# Patient Record
Sex: FEMALE | Race: WHITE | NOT HISPANIC OR LATINO | Employment: OTHER | URBAN - METROPOLITAN AREA
[De-identification: names, ages, dates, MRNs, and addresses within clinical notes are randomized per-mention and may not be internally consistent; named-entity substitution may affect disease eponyms.]

---

## 2018-05-16 PROBLEM — I50.32 CHRONIC DIASTOLIC HEART FAILURE: Status: ACTIVE | Noted: 2018-05-16

## 2019-08-31 PROBLEM — E66.01 MORBID OBESITY WITH BMI OF 40.0-44.9, ADULT: Status: ACTIVE | Noted: 2019-08-31

## 2019-08-31 PROBLEM — E87.6 HYPOKALEMIA: Status: ACTIVE | Noted: 2019-08-31

## 2019-08-31 PROBLEM — T84.54XA INFECTION OF PROSTHETIC LEFT KNEE JOINT: Status: ACTIVE | Noted: 2019-08-31

## 2020-02-06 PROBLEM — K76.0 FATTY LIVER DISEASE, NONALCOHOLIC: Status: ACTIVE | Noted: 2020-02-06

## 2020-02-06 PROBLEM — I50.33 ACUTE ON CHRONIC DIASTOLIC CONGESTIVE HEART FAILURE: Status: ACTIVE | Noted: 2020-02-05

## 2020-02-06 PROBLEM — E83.42 HYPOMAGNESEMIA: Status: ACTIVE | Noted: 2020-02-06

## 2020-02-06 PROBLEM — R00.1 BRADYCARDIA: Status: ACTIVE | Noted: 2020-02-06

## 2020-07-02 PROBLEM — Z79.01 CHRONIC ANTICOAGULATION: Status: ACTIVE | Noted: 2020-07-02

## 2020-09-03 PROBLEM — E66.812 CLASS 2 SEVERE OBESITY DUE TO EXCESS CALORIES WITH SERIOUS COMORBIDITY AND BODY MASS INDEX (BMI) OF 38.0 TO 38.9 IN ADULT: Status: ACTIVE | Noted: 2019-08-31

## 2020-09-03 PROBLEM — I50.9 ACUTE ON CHRONIC CONGESTIVE HEART FAILURE: Status: RESOLVED | Noted: 2020-07-02 | Resolved: 2020-09-03

## 2020-09-22 PROBLEM — I50.20 HFREF (HEART FAILURE WITH REDUCED EJECTION FRACTION): Status: ACTIVE | Noted: 2020-09-22

## 2021-01-20 ENCOUNTER — TELEPHONE (OUTPATIENT)
Dept: INTERNAL MEDICINE | Facility: CLINIC | Age: 81
End: 2021-01-20

## 2021-01-20 NOTE — TELEPHONE ENCOUNTER
Carlee with [facility] called to follow up on some orders that were sent.   10/4  11/12  1/7/21  1/14/21- 2 were sent this date  Please advise if any of them need to be resent or if you have them and have been faxed back. Fax # [phone]  Please call her back to follow up if they have been done or still pending signature of PCP at [phone]

## 2021-01-25 ENCOUNTER — TELEPHONE (OUTPATIENT)
Dept: INTERNAL MEDICINE | Facility: CLINIC | Age: 81
End: 2021-01-25

## 2021-01-25 NOTE — TELEPHONE ENCOUNTER
Caller: Linda Martel    Relationship to patient: Self    Best call back number: [phone]    Patient is needing: PT CALLED REQUESTING A CALL BACK REGARDING QUESTIONS ABOUT SEVERAL OF HER MEDICATIONS. PT STATES SHE WAS SEEN BY THE HEART FAILURE CLINIC AT [facility] AND THEY ARE WANTING TO CHANGE SOME MEDICATIONS AND WANTS TO MAKE SURE THIS WILL BE OK. PT DID NOT LEAVE ANY OTHER INFORMATION

## 2021-02-03 PROBLEM — R11.0 NAUSEA: Status: RESOLVED | Noted: 2020-02-07 | Resolved: 2021-02-03

## 2021-03-31 ENCOUNTER — TELEPHONE (OUTPATIENT)
Dept: INTERNAL MEDICINE | Facility: CLINIC | Age: 81
End: 2021-03-31

## 2021-03-31 NOTE — TELEPHONE ENCOUNTER
PATIENT ASKED FOR REBECCA TO CALL HER BACK REGARDING SAMPLES OF MEDICATION Insulin Degludec (TRESIBA FLEXTOUCH SC), STATED SHE WILL BE OUT NEXT WEEK AND WOULD LIKE MORE SAMPLES IF POSSIBLE.    We dont have any samples of Tresiba, can you please look and see if anything is compatible?  Thank you!

## 2021-03-31 NOTE — TELEPHONE ENCOUNTER
Caller: Michelle Martel    Relationship: Self    Best call back number: [phone] (H)    What is the best time to reach you: ANYTIME    Who are you requesting to speak with (clinical staff, provider,  specific staff member): REBECCA    Do you know the name of the person who called: MICHELLE MARTEL    What was the call regarding: PATIENT ASKED FOR REBECCA TO CALL HER BACK REGARDING SAMPLES OF MEDICATION Insulin Degludec (TRESIBA FLEXTOUCH SC), STATED SHE WILL BE OUT NEXT WEEK AND WOULD LIKE MORE SAMPLES IF POSSIBLE.    Do you require a callback: YES

## 2021-04-02 NOTE — TELEPHONE ENCOUNTER
Ok lets look and see what insulin we have in fridge today before we leave, what dose of tresiba is she currently using         Documentation     You  Karan Graham MD 2 days ago   MD     PATIENT ASKED FOR REBECCA TO CALL HER BACK REGARDING SAMPLES OF MEDICATION Insulin Degludec (TRESIBA FLEXTOUCH SC), STATED SHE WILL BE OUT NEXT WEEK AND WOULD LIKE MORE SAMPLES IF POSSIBLE.     We dont have any samples of Tresiba, can you please look and see if anything is compatible?  Thank you!          Tresiba 200mg 16units

## 2021-04-02 NOTE — TELEPHONE ENCOUNTER
Ok lets look and see what insulin we have in fridge today before we leave, what dose of tresiba is she currently using

## 2021-08-18 ENCOUNTER — TELEPHONE (OUTPATIENT)
Dept: INTERNAL MEDICINE | Facility: CLINIC | Age: 81
End: 2021-08-18

## 2021-08-18 NOTE — TELEPHONE ENCOUNTER
Caller: VICTORIA MARTI    Relationship to patient: Other    Best call back number: [phone]    Patient is needing: THERE WAS A REFERRAL FOR CAREGIVER FOR THE PATIENT SENT OVER ON 8/13/21. PLEASE REACH OUT AND LET THEM KNOW IF WE HAVE RECEIVED

## 2021-08-31 ENCOUNTER — TELEPHONE (OUTPATIENT)
Dept: INTERNAL MEDICINE | Facility: CLINIC | Age: 81
End: 2021-08-31

## 2021-08-31 NOTE — TELEPHONE ENCOUNTER
Caller: TOSIN KOTHARI/REAL"[employer]"    Best call back number: [phone]    Who are you requesting to speak with (clinical staff, provider,  specific staff member): CLINICAL STAFF MEMBER    What was the call regarding: TOSIN WITH [facility] CALLED NEEDING TO CONFIRM THAT DR. RALPH STILL WANTS THE PATIENT TO DO INR CHECKS. TOSIN STATES THEY STILL HAVE AN ACTIVE ORDER.     Do you require a callback: YES

## 2021-09-22 ENCOUNTER — TELEPHONE (OUTPATIENT)
Dept: INTERNAL MEDICINE | Facility: CLINIC | Age: 81
End: 2021-09-22

## 2021-09-22 NOTE — TELEPHONE ENCOUNTER
Caller: Argentina Rosales    Relationship: Emergency Contact    Best call back number: [phone]     What form or medical record are you requesting: PATIENT IS REQUESTING A RESERVED  PARKING SPOT NOT HANDICAP STICKER.    Who is requesting this form or medical record from you:  RESERVED PARKING SPOT     How would you like to receive the form or medical records (pick-up, mail, fax): FAX ATTENTION LI   If fax, what is the fax number: [phone] SAPNA JEFFERSON   If mail, what is the address:   If pick-up, provide patient with address and location details    Timeframe paperwork needed: 09/30/2021     Additional notes: PATIENT IS NEEDING THIS REQUEST, BY 09/30/2021

## 2021-11-04 NOTE — TELEPHONE ENCOUNTER
APPARENTLY, THERE WAS A FORM THAT NEEDED TO BE FILLED OUT PER AMADEO'S PREVIOUS MESSAGE.  I'M NOT SURE WHERE THAT IS.    THANK YOU

## 2022-02-03 ENCOUNTER — TELEPHONE (OUTPATIENT)
Dept: INTERNAL MEDICINE | Facility: CLINIC | Age: 82
End: 2022-02-03

## 2022-02-03 NOTE — TELEPHONE ENCOUNTER
Ok, hold her dose today and then restart as follows:    5mg on 2 days of week  2.5mg on 5 days of week    Rechcek 1 weeks

## 2022-02-03 NOTE — TELEPHONE ENCOUNTER
Caller: Linda Martel    Relationship to patient: Self    Best call back number: [phone]    Patient is needing: PATIENTS INR IS 4.2 TODAY

## 2022-04-29 PROBLEM — Z79.01 CHRONIC ANTICOAGULATION: Status: RESOLVED | Noted: 2020-07-02 | Resolved: 2022-04-29

## 2022-05-20 ENCOUNTER — TELEPHONE (OUTPATIENT)
Dept: INTERNAL MEDICINE | Facility: CLINIC | Age: 82
End: 2022-05-20

## 2022-05-20 NOTE — TELEPHONE ENCOUNTER
Caller: Linda Martel    Relationship: Self    Best call back number: [phone]    What is the best time to reach you: ANYTIME    Who are you requesting to speak with (clinical staff, provider,  specific staff member): NATY     Do you know the name of the person who called: NATY    What was the call regarding: INR    Do you require a callback: YES    ATTEMPTED TPO WARM TRANSFER

## 2023-01-06 ENCOUNTER — TELEPHONE (OUTPATIENT)
Dept: INTERNAL MEDICINE | Facility: CLINIC | Age: 83
End: 2023-01-06

## 2023-01-06 NOTE — TELEPHONE ENCOUNTER
Hub staff attempted to follow warm transfer process and was unsuccessful     Caller: [facility]    Relationship to patient:     Best call back number: [phone]    Patient is needing: FLY FROM [facility] REQUESTING A CALLBACK TO SPEAK TO WHO DOES RPIOR AUTHORIZATIONS REGARDING THE PATIENT'S Insulin Degludec (Tresiba FlexTouch) 200 UNIT/ML solution pen-injector pen injection.    REFERENCE NUMBER: [id number]

## 2023-02-15 ENCOUNTER — TELEPHONE (OUTPATIENT)
Dept: INTERNAL MEDICINE | Facility: CLINIC | Age: 83
End: 2023-02-15

## 2023-02-15 NOTE — TELEPHONE ENCOUNTER
Pharmacy Name:     [facility] - [place], [place] - [address] [facility] [address] - [phone] [facility] [phone] FX (Pharmacy) [phone]         Pharmacy representative name:  DOUGLAS       What medication are you calling in regards to:     1.  NYSTANTIN POWDER   DIRECTIONS 3 TIMES DAILY   IS THAT REPLACING PREVIOUS RX QID     2.  OMEPRAZOLE 40 CAP   1QPRN   ONE DAILY OR WHAT IS MAX DAILY ?     3.  NITROG 0.4 TAB   MAX NUMBER OF TABS PER DAY       What question does the pharmacy have:     Who is the provider that prescribed the medication:  Authorized by: GERONIMO RALPH

## 2023-03-27 ENCOUNTER — TELEPHONE (OUTPATIENT)
Dept: INTERNAL MEDICINE | Facility: CLINIC | Age: 83
End: 2023-03-27

## 2023-03-27 NOTE — TELEPHONE ENCOUNTER
Caller: FELIPE    Relationship: Other    Best call back number: [phone]    What is the best time to reach you: ANY TIME    Who are you requesting to speak with (clinical staff, provider,  specific staff member): CLINICAL STAFF    What was the call regarding: FELIPE WITH [facility] CALLED AND REQUESTS A CALLBACK REGARDING PRE-AUTHROIZATION FOR TRESIBA FLEX TOUCH.  CALL REFERENCE # [id number].    Do you require a callback: YES    PLEASE ADVISE.

## 2023-04-04 ENCOUNTER — TELEPHONE (OUTPATIENT)
Dept: INTERNAL MEDICINE | Facility: CLINIC | Age: 83
End: 2023-04-04

## 2023-04-04 NOTE — TELEPHONE ENCOUNTER
Caller: [facility] - [place], [place] - [address] [facility] [address] - [phone] [facility] [phone]     Relationship to patient: Pharmacy    Best call back number: [phone]    Patient is needing:   WE HAVE AN INCORRECT DATE OF BIRTH BECAUSE HER BIRTH CERTIFICATE HAS AN INCORRECT , 1940 FOR INSURANCE REASONS ALTHOUGH IT'S 1940 [employer] HAS BEEN BILLING WITH 1940 AND THE AGENT ADVISED THAT IT DOESN'T MAKE SENSE THAT WE HAVE THE INCORRECT  AND THAT INSURANCE WOULD APPROVE BOTH.     TRYING TO GET CLARIFICATION.       REFERENCE: [id number]

## 2023-05-08 ENCOUNTER — TELEPHONE (OUTPATIENT)
Dept: INTERNAL MEDICINE | Facility: CLINIC | Age: 83
End: 2023-05-08

## 2023-05-08 NOTE — TELEPHONE ENCOUNTER
Caller: [facility] - [place], [place] - [address] [facility] - [phone] [facility] [phone] FX    Relationship: Pharmacy    Best call back number: [phone]    What medication are you requesting: FLUCONAZOLE 150 MG TABLETS     What are your current symptoms: PHARMACY STATES PATIENT IS REQUESTING A 90 DAY REFILL FOR THIS     How long have you been experiencing symptoms: TODAY     Have you had these symptoms before:    [x] Yes  [] No    Have you been treated for these symptoms before:   [x] Yes  [] No    If a prescription is needed, what is your preferred pharmacy and phone number: [facility] - [place], [place] - [address] [facility] - [phone] [facility] [phone] FX     Additional notes:

## 2023-05-19 PROBLEM — I50.812 CHRONIC RIGHT-SIDED HEART FAILURE: Status: ACTIVE | Noted: 2023-05-19

## 2023-05-27 ENCOUNTER — APPOINTMENT (OUTPATIENT)
Dept: GENERAL RADIOLOGY | Facility: HOSPITAL | Age: 83
End: 2023-05-27
Payer: MEDICARE

## 2023-05-27 ENCOUNTER — HOSPITAL ENCOUNTER (OUTPATIENT)
Facility: HOSPITAL | Age: 83
Setting detail: OBSERVATION
Discharge: HOME | End: 2023-05-28
Attending: EMERGENCY MEDICINE | Admitting: EMERGENCY MEDICINE
Payer: MEDICARE

## 2023-05-27 DIAGNOSIS — I48.20 CHRONIC ATRIAL FIBRILLATION: ICD-10-CM

## 2023-05-27 DIAGNOSIS — I50.9 ACUTE ON CHRONIC CONGESTIVE HEART FAILURE, UNSPECIFIED HEART FAILURE TYPE: Primary | ICD-10-CM

## 2023-05-27 LAB
ALBUMIN SERPL-MCNC: 4.6 G/DL (ref 3.5–5.2)
ALBUMIN/GLOB SERPL: 1.8 G/DL
ALP SERPL-CCNC: 94 U/L (ref 39–117)
ALT SERPL W P-5'-P-CCNC: 13 U/L (ref 1–33)
ANION GAP SERPL CALCULATED.3IONS-SCNC: 12 MMOL/L (ref 5–15)
AST SERPL-CCNC: 21 U/L (ref 1–32)
BASOPHILS # BLD AUTO: 0.04 10*3/MM3 (ref 0–0.2)
BASOPHILS NFR BLD AUTO: 0.6 % (ref 0–1.5)
BILIRUB SERPL-MCNC: 0.9 MG/DL (ref 0–1.2)
BUN SERPL-MCNC: 40 MG/DL (ref 8–23)
BUN/CREAT SERPL: 26.5 (ref 7–25)
CALCIUM SPEC-SCNC: 9.6 MG/DL (ref 8.6–10.5)
CHLORIDE SERPL-SCNC: 96 MMOL/L (ref 98–107)
CHOLEST SERPL-MCNC: 113 MG/DL (ref 0–200)
CO2 SERPL-SCNC: 31 MMOL/L (ref 22–29)
CREAT SERPL-MCNC: 1.51 MG/DL (ref 0.57–1)
DEPRECATED RDW RBC AUTO: 41.1 FL (ref 37–54)
EGFRCR SERPLBLD CKD-EPI 2021: 34.4 ML/MIN/1.73
EOSINOPHIL # BLD AUTO: 0 10*3/MM3 (ref 0–0.4)
EOSINOPHIL NFR BLD AUTO: 0 % (ref 0.3–6.2)
ERYTHROCYTE [DISTWIDTH] IN BLOOD BY AUTOMATED COUNT: 12.5 % (ref 12.3–15.4)
GEN 5 2HR TROPONIN T REFLEX: 28 NG/L
GLOBULIN UR ELPH-MCNC: 2.5 GM/DL
GLUCOSE BLDC GLUCOMTR-MCNC: 119 MG/DL (ref 70–130)
GLUCOSE BLDC GLUCOMTR-MCNC: 120 MG/DL (ref 70–130)
GLUCOSE SERPL-MCNC: 103 MG/DL (ref 65–99)
HCT VFR BLD AUTO: 44.5 % (ref 34–46.6)
HDLC SERPL-MCNC: 36 MG/DL (ref 40–60)
HGB BLD-MCNC: 14.5 G/DL (ref 12–15.9)
IMM GRANULOCYTES # BLD AUTO: 0.02 10*3/MM3 (ref 0–0.05)
IMM GRANULOCYTES NFR BLD AUTO: 0.3 % (ref 0–0.5)
INR PPP: 2.63 (ref 0.9–1.1)
INR PPP: 2.64 (ref 0.9–1.1)
LDLC SERPL CALC-MCNC: 44 MG/DL (ref 0–100)
LDLC/HDLC SERPL: 1.02 {RATIO}
LYMPHOCYTES # BLD AUTO: 0.97 10*3/MM3 (ref 0.7–3.1)
LYMPHOCYTES NFR BLD AUTO: 14.9 % (ref 19.6–45.3)
MCH RBC QN AUTO: 29.7 PG (ref 26.6–33)
MCHC RBC AUTO-ENTMCNC: 32.6 G/DL (ref 31.5–35.7)
MCV RBC AUTO: 91.2 FL (ref 79–97)
MONOCYTES # BLD AUTO: 0.45 10*3/MM3 (ref 0.1–0.9)
MONOCYTES NFR BLD AUTO: 6.9 % (ref 5–12)
NEUTROPHILS NFR BLD AUTO: 5.03 10*3/MM3 (ref 1.7–7)
NEUTROPHILS NFR BLD AUTO: 77.3 % (ref 42.7–76)
NRBC BLD AUTO-RTO: 0 /100 WBC (ref 0–0.2)
NT-PROBNP SERPL-MCNC: 1268 PG/ML (ref 0–1800)
PLATELET # BLD AUTO: 231 10*3/MM3 (ref 140–450)
PMV BLD AUTO: 10.5 FL (ref 6–12)
POTASSIUM SERPL-SCNC: 5.2 MMOL/L (ref 3.5–5.2)
PROT SERPL-MCNC: 7.1 G/DL (ref 6–8.5)
PROTHROMBIN TIME: 28.6 SECONDS (ref 11.7–14.2)
PROTHROMBIN TIME: 28.7 SECONDS (ref 11.7–14.2)
QT INTERVAL: 373 MS
RBC # BLD AUTO: 4.88 10*6/MM3 (ref 3.77–5.28)
SODIUM SERPL-SCNC: 139 MMOL/L (ref 136–145)
TRIGL SERPL-MCNC: 202 MG/DL (ref 0–150)
TROPONIN T DELTA: 7 NG/L
TROPONIN T SERPL HS-MCNC: 21 NG/L
TROPONIN T SERPL HS-MCNC: 23 NG/L
TROPONIN T SERPL HS-MCNC: 27 NG/L
VLDLC SERPL-MCNC: 33 MG/DL (ref 5–40)
WBC NRBC COR # BLD: 6.51 10*3/MM3 (ref 3.4–10.8)

## 2023-05-27 PROCEDURE — 85610 PROTHROMBIN TIME: CPT | Performed by: EMERGENCY MEDICINE

## 2023-05-27 PROCEDURE — 83880 ASSAY OF NATRIURETIC PEPTIDE: CPT | Performed by: EMERGENCY MEDICINE

## 2023-05-27 PROCEDURE — 93005 ELECTROCARDIOGRAM TRACING: CPT | Performed by: NURSE PRACTITIONER

## 2023-05-27 PROCEDURE — 71046 X-RAY EXAM CHEST 2 VIEWS: CPT

## 2023-05-27 PROCEDURE — 84484 ASSAY OF TROPONIN QUANT: CPT | Performed by: EMERGENCY MEDICINE

## 2023-05-27 PROCEDURE — 99285 EMERGENCY DEPT VISIT HI MDM: CPT

## 2023-05-27 PROCEDURE — 85025 COMPLETE CBC W/AUTO DIFF WBC: CPT | Performed by: EMERGENCY MEDICINE

## 2023-05-27 PROCEDURE — G0378 HOSPITAL OBSERVATION PER HR: HCPCS

## 2023-05-27 PROCEDURE — 36415 COLL VENOUS BLD VENIPUNCTURE: CPT

## 2023-05-27 PROCEDURE — 93010 ELECTROCARDIOGRAM REPORT: CPT | Performed by: STUDENT IN AN ORGANIZED HEALTH CARE EDUCATION/TRAINING PROGRAM

## 2023-05-27 PROCEDURE — 96374 THER/PROPH/DIAG INJ IV PUSH: CPT

## 2023-05-27 PROCEDURE — 71045 X-RAY EXAM CHEST 1 VIEW: CPT

## 2023-05-27 PROCEDURE — 84484 ASSAY OF TROPONIN QUANT: CPT | Performed by: NURSE PRACTITIONER

## 2023-05-27 PROCEDURE — 80053 COMPREHEN METABOLIC PANEL: CPT | Performed by: EMERGENCY MEDICINE

## 2023-05-27 PROCEDURE — 82948 REAGENT STRIP/BLOOD GLUCOSE: CPT

## 2023-05-27 PROCEDURE — 93005 ELECTROCARDIOGRAM TRACING: CPT | Performed by: EMERGENCY MEDICINE

## 2023-05-27 PROCEDURE — 25010000002 FUROSEMIDE PER 20 MG: Performed by: EMERGENCY MEDICINE

## 2023-05-27 PROCEDURE — 85610 PROTHROMBIN TIME: CPT | Performed by: NURSE PRACTITIONER

## 2023-05-27 PROCEDURE — 80061 LIPID PANEL: CPT | Performed by: NURSE PRACTITIONER

## 2023-05-27 RX ORDER — WARFARIN SODIUM 2.5 MG/1
2.5 TABLET ORAL ONCE
Status: DISCONTINUED | OUTPATIENT
Start: 2023-05-27 | End: 2023-05-27

## 2023-05-27 RX ORDER — HYDROCODONE BITARTRATE AND ACETAMINOPHEN 5; 325 MG/1; MG/1
1 TABLET ORAL EVERY 6 HOURS PRN
Status: DISCONTINUED | OUTPATIENT
Start: 2023-05-27 | End: 2023-05-28 | Stop reason: HOSPADM

## 2023-05-27 RX ORDER — DILTIAZEM HYDROCHLORIDE 120 MG/1
120 CAPSULE, COATED, EXTENDED RELEASE ORAL DAILY
Status: DISCONTINUED | OUTPATIENT
Start: 2023-05-27 | End: 2023-05-27

## 2023-05-27 RX ORDER — DOXYCYCLINE 100 MG/1
100 CAPSULE ORAL EVERY 12 HOURS SCHEDULED
Status: DISCONTINUED | OUTPATIENT
Start: 2023-05-27 | End: 2023-05-28 | Stop reason: HOSPADM

## 2023-05-27 RX ORDER — SODIUM CHLORIDE 9 MG/ML
40 INJECTION, SOLUTION INTRAVENOUS AS NEEDED
Status: DISCONTINUED | OUTPATIENT
Start: 2023-05-27 | End: 2023-05-28 | Stop reason: HOSPADM

## 2023-05-27 RX ORDER — VITAMIN E 268 MG
400 CAPSULE ORAL DAILY
Status: DISCONTINUED | OUTPATIENT
Start: 2023-05-27 | End: 2023-05-28 | Stop reason: HOSPADM

## 2023-05-27 RX ORDER — WARFARIN SODIUM 2.5 MG/1
2.5 TABLET ORAL ONCE
Status: COMPLETED | OUTPATIENT
Start: 2023-05-27 | End: 2023-05-27

## 2023-05-27 RX ORDER — SODIUM CHLORIDE 0.9 % (FLUSH) 0.9 %
10 SYRINGE (ML) INJECTION AS NEEDED
Status: DISCONTINUED | OUTPATIENT
Start: 2023-05-27 | End: 2023-05-28 | Stop reason: HOSPADM

## 2023-05-27 RX ORDER — METOPROLOL SUCCINATE 50 MG/1
100 TABLET, EXTENDED RELEASE ORAL 2 TIMES DAILY
Status: DISCONTINUED | OUTPATIENT
Start: 2023-05-27 | End: 2023-05-27

## 2023-05-27 RX ORDER — FERROUS SULFATE 325(65) MG
325 TABLET ORAL
Status: DISCONTINUED | OUTPATIENT
Start: 2023-05-28 | End: 2023-05-28 | Stop reason: HOSPADM

## 2023-05-27 RX ORDER — SPIRONOLACTONE 25 MG/1
12.5 TABLET ORAL DAILY
Status: DISCONTINUED | OUTPATIENT
Start: 2023-05-27 | End: 2023-05-28 | Stop reason: HOSPADM

## 2023-05-27 RX ORDER — FUROSEMIDE 10 MG/ML
20 INJECTION INTRAMUSCULAR; INTRAVENOUS ONCE
Status: COMPLETED | OUTPATIENT
Start: 2023-05-27 | End: 2023-05-27

## 2023-05-27 RX ORDER — NITROGLYCERIN 0.4 MG/1
0.4 TABLET SUBLINGUAL
Status: DISCONTINUED | OUTPATIENT
Start: 2023-05-27 | End: 2023-05-28 | Stop reason: HOSPADM

## 2023-05-27 RX ORDER — POTASSIUM CHLORIDE 750 MG/1
10 TABLET, FILM COATED, EXTENDED RELEASE ORAL NIGHTLY
Status: DISCONTINUED | OUTPATIENT
Start: 2023-05-27 | End: 2023-05-28 | Stop reason: HOSPADM

## 2023-05-27 RX ORDER — BUMETANIDE 2 MG/1
4 TABLET ORAL 2 TIMES DAILY
Status: DISCONTINUED | OUTPATIENT
Start: 2023-05-27 | End: 2023-05-27

## 2023-05-27 RX ORDER — PANTOPRAZOLE SODIUM 40 MG/1
40 TABLET, DELAYED RELEASE ORAL
Status: DISCONTINUED | OUTPATIENT
Start: 2023-05-28 | End: 2023-05-28 | Stop reason: HOSPADM

## 2023-05-27 RX ORDER — METOPROLOL SUCCINATE 50 MG/1
100 TABLET, EXTENDED RELEASE ORAL 2 TIMES DAILY
Status: DISCONTINUED | OUTPATIENT
Start: 2023-05-28 | End: 2023-05-28 | Stop reason: HOSPADM

## 2023-05-27 RX ORDER — SODIUM CHLORIDE 0.9 % (FLUSH) 0.9 %
10 SYRINGE (ML) INJECTION EVERY 12 HOURS SCHEDULED
Status: DISCONTINUED | OUTPATIENT
Start: 2023-05-27 | End: 2023-05-28 | Stop reason: HOSPADM

## 2023-05-27 RX ORDER — BUMETANIDE 2 MG/1
4 TABLET ORAL
Status: DISCONTINUED | OUTPATIENT
Start: 2023-05-28 | End: 2023-05-28 | Stop reason: HOSPADM

## 2023-05-27 RX ORDER — ASPIRIN 81 MG/1
324 TABLET, CHEWABLE ORAL ONCE
Status: COMPLETED | OUTPATIENT
Start: 2023-05-27 | End: 2023-05-27

## 2023-05-27 RX ORDER — PROCHLORPERAZINE MALEATE 10 MG
10 TABLET ORAL EVERY 6 HOURS PRN
Status: DISCONTINUED | OUTPATIENT
Start: 2023-05-27 | End: 2023-05-28 | Stop reason: HOSPADM

## 2023-05-27 RX ORDER — ASPIRIN 81 MG/1
81 TABLET ORAL DAILY
Status: DISCONTINUED | OUTPATIENT
Start: 2023-05-28 | End: 2023-05-28 | Stop reason: HOSPADM

## 2023-05-27 RX ORDER — ONDANSETRON 4 MG/1
4 TABLET, FILM COATED ORAL EVERY 4 HOURS PRN
Status: DISCONTINUED | OUTPATIENT
Start: 2023-05-27 | End: 2023-05-28 | Stop reason: HOSPADM

## 2023-05-27 RX ORDER — CHOLECALCIFEROL (VITAMIN D3) 125 MCG
1000 CAPSULE ORAL DAILY
Status: DISCONTINUED | OUTPATIENT
Start: 2023-05-27 | End: 2023-05-28 | Stop reason: HOSPADM

## 2023-05-27 RX ADMIN — POTASSIUM CHLORIDE 10 MEQ: 750 TABLET, EXTENDED RELEASE ORAL at 21:28

## 2023-05-27 RX ADMIN — DOXYCYCLINE 100 MG: 100 CAPSULE ORAL at 20:58

## 2023-05-27 RX ADMIN — INSULIN GLARGINE-YFGN 15 UNITS: 100 INJECTION, SOLUTION SUBCUTANEOUS at 21:29

## 2023-05-27 RX ADMIN — Medication 10 ML: at 21:28

## 2023-05-27 RX ADMIN — WARFARIN 2.5 MG: 2.5 TABLET ORAL at 21:26

## 2023-05-27 RX ADMIN — ASPIRIN 324 MG: 81 TABLET, CHEWABLE ORAL at 16:47

## 2023-05-27 RX ADMIN — Medication 400 UNITS: at 20:58

## 2023-05-27 RX ADMIN — FUROSEMIDE 20 MG: 20 INJECTION, SOLUTION INTRAMUSCULAR; INTRAVENOUS at 16:49

## 2023-05-27 NOTE — H&P
[facility]   HISTORY AND PHYSICAL    Patient Name: Linda Martel  : 1940  MRN: [id number]  Primary Care Physician:  Karan Graham MD  Date of admission: 2023    Subjective   Subjective     Chief Complaint:   Chief Complaint   Patient presents with   • Shortness of Breath         HPI:    Linda Martel is a pleasant afebrile ambulatory 83 y.o.  female with a past medical history of permanent atrial fibrillation on warfarin anticoagulation, hypertension, hyperlipidemia, pulmonary hypertension, type 2 diabetes, heart failure with reduced ejection fraction, and sleep apnea.    She presents to the emergency department at [facility] today with complaint of exertional dyspnea.  She has been admitted to the ED observation unit for further testing and evaluation.    Patient states that she was seen in the office by APRN with cardiology service on 2023.  She was having episodes of low blood pressure and was told to hold her diltiazem for a few days.  Unfortunately patient states she did not get follow-up with the cardiology service and stayed off of her diltiazem over the last 8 days.  She presents to the emergency department at [facility] with complaint of exertional dyspnea she was found to have some tachycardia with exertion, at rest her heart rate is in the 80s and low 90s but when she gets up to take a few steps her heart rate goes into the 110s and 120s..  She reports chronic mild lower extremity edema, she denies any obvious weight gain.  States she is compliant with her other medications.  She denies any chest pain, cough, fever or chills.    Review of Systems   All systems were reviewed and negative except for: Exertional dyspnea    Personal History     Past Medical History:   Diagnosis Date   • Atrial fibrillation    • CHF (congestive heart failure)    • Diabetes mellitus    • Hyperlipidemia    • Hypertension    • IBS (irritable bowel syndrome)     • Joint infection 2020    PROPHALITIC ANTIBX   • JUANITA (obstructive sleep apnea)    • Pulmonary hypertension        Past Surgical History:   Procedure Laterality Date   • APPENDECTOMY     • BACK SURGERY     • CARDIAC CATHETERIZATION N/A 06/01/2017    Procedure: Coronary angiography;  Surgeon: Jacques Elliott MD;  Location:  [facility] CATH INVASIVE LOCATION;  Service:    • CARDIAC CATHETERIZATION N/A 06/06/2018    Procedure: Right Heart Cath with vasodilator challenge  Dr. elliott to perform;  Surgeon: Jacques Elliott MD;  Location:  [facility]VAS[facility];  Service: Cardiovascular   • CARDIAC CATHETERIZATION N/A 06/06/2018    Procedure: Left Heart Cath;  Surgeon: Jacques Elliott MD;  Location:  [facility]ATH INVASIVE LOCATION;  Service: Cardiovascular   • CARDIAC CATHETERIZATION N/A 07/08/2020    Procedure: Right and Left Heart Cath;  Surgeon: Saima Andrade MD;  Location: [facility] CATH [facility]ASIVE LOC[facility];  Service: Cardiovascular;  Laterality: N/A;  cors     • CARDIAC CATHETERIZATION N/A 07/08/2020    Procedure: Coronary angiography;  Surgeon: Saima Andrade MD;  Location: [facility] C[facility]ASIVE LOC[facility];  Service: Cardiovascular;  Laterality: N/A;   • COLONOSCOPY     • EYE SURGERY Bilateral     CATARACTS   • GALLBLADDER SURGERY     • HIP SURGERY Left    • KNEE SURGERY Bilateral    • TONSILLECTOMY     • TOTAL HIP ARTHROPLASTY Right 5/2/2022    Procedure: TOTAL HIP ARTHROPLASTY ANTERIOR WITH HANA TABLE;  Surgeon: Gio Mays II, MD;  Location: [facility] OR;  Service: Orthopedics;  Laterality: Right;   • TOTAL KNEE ARTHROPLASTY Left 09/01/2019    Procedure: TOTAL KNEE ARTHROPLASTY, I&D with liner exchange;  Surgeon: Gio Mays II, MD;  Location: [facility] OR;  Service: Orthopedics       Family History: family history includes Cancer in her father; Heart disease in her father and mother; Stroke in her father. Otherwise pertinent FHx was reviewed and not pertinent to current issue.    Social  History:  reports that she has never smoked. She has never been exposed to tobacco smoke. She has never used smokeless tobacco. She reports that she does not currently use alcohol after a past usage of about 1.0 standard drink per week. She reports that she does not use drugs.    Home Medications:  Clotrimazole, HYDROcodone-acetaminophen, Insulin Glargine, Ondansetron HCl, OneTouch Delica Plus [id number], Probiotic Product, bumetanide, dilTIAZem CD, doxycycline, empagliflozin, ferrous sulfate, hydrocortisone, metFORMIN, metoprolol succinate XL, nitroglycerin, nystatin, omeprazole, potassium chloride, prochlorperazine, sacubitril-valsartan, senna, spironolactone, vitamin B-12, vitamin E, and warfarin    Allergies:  Allergies   Allergen Reactions   • Penicillins Rash       Objective   Objective     Vitals:   Temp:  [97.2 °F (36.2 °C)] 97.2 °F (36.2 °C)  Heart Rate:  [] 88  Resp:  [16] 16  BP: (101-134)/(66-97) 115/84  Physical Exam    Constitutional: Awake, alert, chronically ill-appearing   Eyes: PERRLA, sclerae anicteric, no conjunctival injection   HENT: NCAT, mucous membranes moist   Neck: Supple, no thyromegaly, no lymphadenopathy, trachea midline   Respiratory: Clear to auscultation bilaterally, nonlabored respirations    Cardiovascular: Irregularly irregular rhythm without tachycardia, no murmurs, rubs, or gallops, palpable pedal pulses bilaterally   Gastrointestinal: Positive bowel sounds, soft, nontender, nondistended   Musculoskeletal: 1+ nonpitting bilateral lower extremity edema, no clubbing or cyanosis to extremities   Psychiatric: Appropriate affect, cooperative   Neurologic: Oriented x 3, strength symmetric in all extremities, Cranial Nerves grossly intact to confrontation, speech clear   Skin: No rashes     Result Review    Result Review:  I have personally reviewed the results from the time of this admission to 5/27/2023 16:28 EDT and agree with these findings:  [x]  Laboratory list /  accordion  []  Microbiology  [x]  Radiology  [x]  EKG/Telemetry   []  Cardiology/Vascular   []  Pathology  []  Old records  []  Other:  Most notable findings include: Creatinine 1.51, high-sensitivity troponin 27, 23, triglycerides 202, INR 2.63, chest x-ray shows calcific aortic atherosclerosis mild central pulmonary venous congestion without overt pulmonary edema, small new left pleural effusion      Assessment & Plan   Assessment / Plan     Brief Patient Summary:  Linda Martel is a 83 y.o. female who is being evaluated for exertional dyspnea    Active Hospital Problems:  Active Hospital Problems    Diagnosis    • **Exertional dyspnea      Plan:     Exertional dyspnea  Echocardiogram pending  Consult to cardiology  High-sensitivity troponin 27, 23, trend  1 view chest x-ray shows nonspecific findings, 2 view chest x-ray pending  Continue Bumex    Atrial fibrillation  Continue warfarin, metoprolol  INR 2.63, repeat in a.m.  EKG shows atrial fibrillation rate of 101, QTc 44    Chronic kidney disease  Creatinine 1.51 today, baseline 1.3-1.5  Trend with a.m. labs    Type 2 diabetes  Continue home medications  Accu-Chek before every meal and nightly    DVT prophylaxis:  Medical DVT prophylaxis orders are present.    CODE STATUS:    Level Of Support Discussed With: Patient  Code Status (Patient has no pulse and is not breathing): CPR (Attempt to Resuscitate)  Medical Interventions (Patient has pulse or is breathing): Full Support    Admission Status:  I believe this patient meets observation status.    Electronically signed by LEYDA Castillo, 05/27/23, 4:28 PM EDT.      75 minutes has been spent by [facility] providers in the care of this patient while under observation status    I have worn appropriate PPE during this patient encounter, sanitized my hands both with entering and exiting patient's room.    I have discussed plan of care with patient including advance care plan and/or  surrogate decision maker.  Patient advises that their daughter Argentina will be their primary surrogate decision maker

## 2023-05-27 NOTE — ED PROVIDER NOTES
EMERGENCY DEPARTMENT ENCOUNTER    Room Number:  106/1  Date seen:  5/29/2023  PCP: Karan Graham MD  Historian: Patient  Relevant information provided by sources other than the patient, review of external records, and social determinants of health may be included in the HPI section.      HPI:  Chief Complaint: Dizziness and low blood pressure  Additional historical features obtained directly from: Patient's family member at bedside who states that she has been very short of breath with exertion and that she checked the blood pressure herself today and it was low.  Context: Linda Martel is a 83 y.o. female who presents to the ED c/o patient was seen by Dr. Farooq about a week ago, and I read that note in epic.  From that note, it was a follow-up visit and at that time her blood pressure was in the 80s over 60s but she felt pretty good.  As a result of that, her Cardizem was discontinued and she was to follow-up.  Patient said that initially for the first few days she felt better with more energy, but then she began to feel short of breath with exertion and felt like her heart rate was racing at times.  She did not have any chest pain, and she had a little swelling in her legs but not much more than usual.    Also reviewed the note in epic, the patient has an ejection fraction of 45 to 50% on her last echo.        Initial impression including social determinants which will impact the assessment patient actually looks pretty good overall, but sounds like she struggling with both rate control and blood pressure, has been trying to adjust in the outpatient setting with her cardiologist but seems to be getting a little worse rather than better with respect to dyspnea on exertion.  I suspect she will need to be admitted for optimization although she does not look unstable          PAST MEDICAL HISTORY  Active Ambulatory Problems     Diagnosis Date Noted   • Permanent atrial fibrillation (CMS/HCC) 10/03/2016    • Essential hypertension 10/03/2016   • Hyperlipidemia 10/03/2016   • Type 2 diabetes mellitus with hyperglycemia, without long-term current use of insulin 10/03/2016   • Pulmonary hypertension 05/16/2018   • JUANITA (obstructive sleep apnea) 08/31/2019   • Infection of prosthetic left knee joint 08/31/2019   • Class 2 severe obesity due to excess calories with serious comorbidity and body mass index (BMI) of 38.0 to 38.9 in adult 08/31/2019   • Fatty liver disease, nonalcoholic 02/06/2020   • HFrEF (heart failure with reduced ejection fraction) 09/22/2020   • Chronic right-sided heart failure 05/19/2023     Resolved Ambulatory Problems     Diagnosis Date Noted   • Chronic diastolic heart failure 05/16/2018   • Hypokalemia 08/31/2019   • Acute on chronic diastolic congestive heart failure 02/05/2020   • Digoxin toxicity 02/06/2020   • Acute kidney failure 02/06/2020   • Hypomagnesemia 02/06/2020   • Bradycardia 02/06/2020   • Nausea 02/07/2020   • Acute on chronic congestive heart failure 07/02/2020   • Chronic anticoagulation 07/02/2020   • DNR (do not resuscitate) 07/10/2020   • Hip joint replacement status 05/02/2022     Past Medical History:   Diagnosis Date   • Atrial fibrillation    • CHF (congestive heart failure)    • Diabetes mellitus    • Hypertension    • IBS (irritable bowel syndrome)    • Joint infection 2020         PAST SURGICAL HISTORY  Past Surgical History:   Procedure Laterality Date   • APPENDECTOMY     • BACK SURGERY     • CARDIAC CATHETERIZATION N/A 06/01/2017    Procedure: Coronary angiography;  Surgeon: Jacques Elliott MD;  Location:  [facility] CATH INVASIVE LOCATION;  Service:    • CARDIAC CATHETERIZATION N/A 06/06/2018    Procedure: Right Heart Cath with vasodilator challenge  Dr. elliott to perform;  Surgeon: Jacques Elliott MD;  Location:  [facility] CATH INVASIVE LOCATION;  Service: Cardiovascular   • CARDIAC CATHETERIZATION N/A 06/06/2018    Procedure: Left Heart Cath;  Surgeon: Jacques Elliott MD;   Location:  [facility] CATH INVASIVE LOCATION;  Service: Cardiovascular   • CARDIAC CATHETERIZATION N/A 07/08/2020    Procedure: Right and Left Heart Cath;  Surgeon: Saima Andrade MD;  Location:  [facility] CATH INVASIVE LOCATION;  Service: Cardiovascular;  Laterality: N/A;  cors     • CARDIAC CATHETERIZATION N/A 07/08/2020    Procedure: Coronary angiography;  Surgeon: Saima Andrade MD;  Location:  [facility] CATH INVASIVE LOCATION;  Service: Cardiovascular;  Laterality: N/A;   • COLONOSCOPY     • EYE SURGERY Bilateral     CATARACTS   • GALLBLADDER SURGERY     • HIP SURGERY Left    • KNEE SURGERY Bilateral    • TONSILLECTOMY     • TOTAL HIP ARTHROPLASTY Right 5/2/2022    Procedure: TOTAL HIP ARTHROPLASTY ANTERIOR WITH HANA TABLE;  Surgeon: Gio Mays II, MD;  Location: [facility] MAIN OR;  Service: Orthopedics;  Laterality: Right;   • TOTAL KNEE ARTHROPLASTY Left 09/01/2019    Procedure: TOTAL KNEE ARTHROPLASTY, I&D with liner exchange;  Surgeon: Gio Mays II, MD;  Location: [facility] MAIN OR;  Service: Orthopedics         FAMILY HISTORY  Family History   Problem Relation Age of Onset   • Heart disease Mother    • Heart disease Father    • Stroke Father    • Cancer Father    • Colon cancer Neg Hx    • Colon polyps Neg Hx    • Esophageal cancer Neg Hx    • Liver cancer Neg Hx    • Rectal cancer Neg Hx    • Stomach cancer Neg Hx    • Malig Hyperthermia Neg Hx          SOCIAL HISTORY  Social History     Socioeconomic History   • Marital status:    Tobacco Use   • Smoking status: Never     Passive exposure: Never   • Smokeless tobacco: Never   • Tobacco comments:     caffeine use: 1 cup daily.    Vaping Use   • Vaping Use: Never used   Substance and Sexual Activity   • Alcohol use: Not Currently     Alcohol/week: 1.0 standard drink     Types: 1 Glasses of wine per week     Comment: occasional   • Drug use: No   • Sexual activity: Defer         ALLERGIES  Penicillins          PHYSICAL EXAM  ED Triage  "Vitals [05/27/23 1206]   Temp Heart Rate Resp BP SpO2   97.2 °F (36.2 °C) 100 16 127/86 97 %      Temp src Heart Rate Source Patient Position BP Location FiO2 (%)   Tympanic Monitor -- -- --         Physical Exam      GENERAL: Awake and alert, no significant distress although she is quite dyspneic with minimal exertion  HENT: nares patent, some mild JVD is present  EYES: no scleral icterus, PERRL, EOMI  CV: Irregularly irregular with a rate of about 80 at rest and about 130 with exertion distal pulses symmetric and palpable  RESPIRATORY: normal effort, dyspneic with exertion but normal at rest.  A few rales in the bases  ABDOMEN: soft, nondistended nontender with normal bowel sound  MUSCULOSKELETAL: no deformity, trace pedal edema  NEURO: alert, moves all extremities, follows commands  PSYCH:  calm, cooperative  SKIN: warm, dry with no rash        LAB RESULTS  No results found for this or any previous visit (from the past 24 hour(s)).    Ordered the above labs and my independent interpretation of these results are discussed in the section labeled \"ED course\" below        RADIOLOGY  No Radiology Exams Resulted Within Past 24 Hours    Ordered the above noted radiological studies.  Independently interpreted by me in PACS.  My independent interpretation of these results are discussed in the section below labeled \"ED course\"        PROCEDURES  Procedures          MEDICATIONS GIVEN IN ER  Medications   furosemide (LASIX) injection 20 mg (20 mg Intravenous Given 5/27/23 1649)   aspirin chewable tablet 324 mg (324 mg Oral Given 5/27/23 1647)   warfarin (COUMADIN) tablet 2.5 mg (2.5 mg Oral Given 5/27/23 2126)   dilTIAZem (CARDIZEM) tablet 120 mg (120 mg Oral Given 5/28/23 1620)                   MEDICAL DECISION MAKING and INDEPENDENT INTERPRETATIONS      Everything documented below this statement is the \"ED course\" section which I have referred to above.  Everything discussed in the following section constitutes MY " INDEPENDENT INTERPRETATIONS of the lab work, EKGs, and radiology studies, and all of my personal conversations with other providers, and all of my independent decision making regarding this patient are discussed below.      ED Course as of 05/29/23 1207   Sat May 27, 2023   1955 Patient has chronic A-fib and is adequately anticoagulated.  Her blood pressure has been reasonable here although soft.  She is not in any distress, but when she gets up to walk she does get quite dyspneic and the heart rate goes up into the 130s in A-fib.  When she comes back and sits down it does slow down as does her breathing. [DP]   1956 She has chronic kidney disease, and perhaps digoxin may not be the best choice for her.  Its not clear from the documentation what other rate control agents have been tried, but I do think she would benefit from improved rate control for her symptoms. [DP]   1956 I spoke with Dr. Green from [facility] cardiology, and she agrees that we will admit the patient to the observation unit and they will consult.  We agreed to give her a small dose of Lasix given that her chest x-ray shows some mild volume overload, and I will relieve the rate control agents to them. [DP]   1956 I spoke with nurse practitioner the observation unit who agrees to admit this patient and help facilitate this plan [DP]   Mon May 29, 2023   1207 White blood cell count and hemoglobin unremarkable [DP]   1207 Troponin indeterminate [DP]   1207 Chemistry shows fairly stable chronic kidney disease [DP]      ED Course User Index  [DP] García Le MD         Everything documented above with this statement, in the ED course section constitutes my independent interpretation of lab work EKG and radiology studies along with my personal conversations with other providers and my independent medical decision making.  This statement will not be repeated before each data point, but they are all my independent interpretation needs contained within the  "\"ED course\" section.             All appropriate hygiene and PPE requirements were satisfied with this patient encounter      FINAL DIAGNOSES  Final diagnoses:   Acute on chronic congestive heart failure, unspecified heart failure type   Chronic atrial fibrillation           DISPOSITION  Admit to observation unit          Latest Documented Vital Signs:  As of 12:07 EDT  BP- 103/83 HR- (!) 123 Temp- 97.9 °F (36.6 °C) (Oral) O2 sat- 97%        --    Please note that portions of this were completed with a voice recognition program.       Note Disclaimer: At [facility], we believe that sharing information builds trust and better relationships. You are receiving this note because you are receiving care at [facility] or recently visited. It is possible you will see health information before a provider has talked with you about it. This kind of information can be easy to misunderstand. To help you fully understand what it      García Le MD  05/29/23 [id number]    "

## 2023-05-27 NOTE — PROGRESS NOTES
[facility] Clinical Pharmacy Services: Warfarin Dosing/Monitoring Consult    Linda Martel is a 83 y.o. female, estimated creatinine clearance is 34.4 mL/min (A) (by C-G formula based on SCr of 1.51 mg/dL (H)). weighing 113 kg (250 lb).    Results from last 7 days   Lab Units 05/27/23  1232   INR  2.63*   HEMOGLOBIN g/dL 14.5   HEMATOCRIT % 44.5   PLATELETS 10*3/mm3 231     Prior to admission anticoagulation: 5mg MWThurs, 2,5mg all other days     Hospital Anticoagulation:  Consulting provider: LEYDA Castillo  Start date: 5/27  Indication: A Fib - requiring full anticoagulation  Target INR: 2 - 3  Expected duration: indefinite   Bridge Therapy: No      Potential food or drug interactions:   Diuretics to improve fluid overload may help decrease INR (fluid overload can elevate INR)  Food: intake decreased from usual daily intake, can increase INR    Education complete?/Date: Yes; PTA    Assessment/Plan:  Dose: Will give 2.5mg today,   H/H noted, WNL. Monitor for any signs or symptoms of bleeding  Follow up daily INRs and dose adjustments    Pharmacy will continue to follow until discharge or discontinuation of warfarin.     Phuong Call [facility]  Clinical Pharmacist

## 2023-05-27 NOTE — ED NOTES
This tech took pt BP manually, on L arm, pt supine with 30 degree hob. The first manual BP was 95/70. The second BP was 90/72.

## 2023-05-27 NOTE — PLAN OF CARE
Goal Outcome Evaluation:                 Patient oriented to unit. She uses a walker to ambulate. Patient is alert, oriented and pleasant.     Patient has stable vital signs but report her blood pressure is low for her.

## 2023-05-28 ENCOUNTER — APPOINTMENT (OUTPATIENT)
Dept: CARDIOLOGY | Facility: HOSPITAL | Age: 83
End: 2023-05-28
Payer: MEDICARE

## 2023-05-28 VITALS
BODY MASS INDEX: 46.01 KG/M2 | RESPIRATION RATE: 16 BRPM | WEIGHT: 250 LBS | DIASTOLIC BLOOD PRESSURE: 83 MMHG | HEART RATE: 123 BPM | SYSTOLIC BLOOD PRESSURE: 103 MMHG | HEIGHT: 62 IN | OXYGEN SATURATION: 97 % | TEMPERATURE: 97.9 F

## 2023-05-28 LAB
ANION GAP SERPL CALCULATED.3IONS-SCNC: 8.8 MMOL/L (ref 5–15)
AORTIC DIMENSIONLESS INDEX: 0.4 (DI)
BH CV ECHO MEAS - AO MAX PG: 9.1 MMHG
BH CV ECHO MEAS - AO MEAN PG: 4.6 MMHG
BH CV ECHO MEAS - AO ROOT DIAM: 2.6 CM
BH CV ECHO MEAS - AO V2 MAX: 150.7 CM/SEC
BH CV ECHO MEAS - AO V2 VTI: 30 CM
BH CV ECHO MEAS - AVA(I,D): 1 CM2
BH CV ECHO MEAS - EDV(CUBED): 62.1 ML
BH CV ECHO MEAS - EDV(MOD-SP2): 60 ML
BH CV ECHO MEAS - EDV(MOD-SP4): 68 ML
BH CV ECHO MEAS - EF(MOD-BP): 41.8 %
BH CV ECHO MEAS - EF(MOD-SP2): 41.7 %
BH CV ECHO MEAS - EF(MOD-SP4): 42.6 %
BH CV ECHO MEAS - ESV(CUBED): 19.5 ML
BH CV ECHO MEAS - ESV(MOD-SP2): 35 ML
BH CV ECHO MEAS - ESV(MOD-SP4): 39 ML
BH CV ECHO MEAS - FS: 32 %
BH CV ECHO MEAS - IVS/LVPW: 0.8 CM
BH CV ECHO MEAS - IVSD: 1.05 CM
BH CV ECHO MEAS - LAT PEAK E' VEL: 8.8 CM/SEC
BH CV ECHO MEAS - LV DIASTOLIC VOL/BSA (35-75): 32.4 CM2
BH CV ECHO MEAS - LV MASS(C)D: 159.9 GRAMS
BH CV ECHO MEAS - LV MAX PG: 1.15 MMHG
BH CV ECHO MEAS - LV MEAN PG: 0.69 MMHG
BH CV ECHO MEAS - LV SYSTOLIC VOL/BSA (12-30): 18.6 CM2
BH CV ECHO MEAS - LV V1 MAX: 53.6 CM/SEC
BH CV ECHO MEAS - LV V1 VTI: 11.5 CM
BH CV ECHO MEAS - LVIDD: 4 CM
BH CV ECHO MEAS - LVIDS: 2.7 CM
BH CV ECHO MEAS - LVOT AREA: 2.6 CM2
BH CV ECHO MEAS - LVOT DIAM: 1.82 CM
BH CV ECHO MEAS - LVPWD: 1.32 CM
BH CV ECHO MEAS - MED PEAK E' VEL: 9.1 CM/SEC
BH CV ECHO MEAS - MR MAX PG: 78.1 MMHG
BH CV ECHO MEAS - MR MAX VEL: 441.8 CM/SEC
BH CV ECHO MEAS - MV DEC SLOPE: 466 CM/SEC2
BH CV ECHO MEAS - MV DEC TIME: 0.2 MSEC
BH CV ECHO MEAS - MV E MAX VEL: 93.5 CM/SEC
BH CV ECHO MEAS - MV MAX PG: 8 MMHG
BH CV ECHO MEAS - MV MEAN PG: 3.6 MMHG
BH CV ECHO MEAS - MV P1/2T: 89.5 MSEC
BH CV ECHO MEAS - MV V2 VTI: 19.4 CM
BH CV ECHO MEAS - MVA(P1/2T): 2.46 CM2
BH CV ECHO MEAS - MVA(VTI): 1.54 CM2
BH CV ECHO MEAS - PA ACC TIME: 0.08 SEC
BH CV ECHO MEAS - PA PR(ACCEL): 44.5 MMHG
BH CV ECHO MEAS - PA V2 MAX: 64.4 CM/SEC
BH CV ECHO MEAS - PI END-D VEL: 59.7 CM/SEC
BH CV ECHO MEAS - RAP SYSTOLE: 3 MMHG
BH CV ECHO MEAS - RV MAX PG: 0.83 MMHG
BH CV ECHO MEAS - RV V1 MAX: 45.4 CM/SEC
BH CV ECHO MEAS - RV V1 VTI: 8.9 CM
BH CV ECHO MEAS - RVSP: 31.5 MMHG
BH CV ECHO MEAS - SI(MOD-SP2): 11.9 ML/M2
BH CV ECHO MEAS - SI(MOD-SP4): 13.8 ML/M2
BH CV ECHO MEAS - SV(LVOT): 29.9 ML
BH CV ECHO MEAS - SV(MOD-SP2): 25 ML
BH CV ECHO MEAS - SV(MOD-SP4): 29 ML
BH CV ECHO MEAS - TAPSE (>1.6): 1.2 CM
BH CV ECHO MEAS - TR MAX PG: 28.5 MMHG
BH CV ECHO MEAS - TR MAX VEL: 266.7 CM/SEC
BH CV ECHO MEASUREMENTS AVERAGE E/E' RATIO: 10.45
BH CV XLRA - RV BASE: 3.8 CM
BH CV XLRA - TDI S': 8.5 CM/SEC
BUN SERPL-MCNC: 35 MG/DL (ref 8–23)
BUN/CREAT SERPL: 23.6 (ref 7–25)
CALCIUM SPEC-SCNC: 9.6 MG/DL (ref 8.6–10.5)
CHLORIDE SERPL-SCNC: 99 MMOL/L (ref 98–107)
CO2 SERPL-SCNC: 30.2 MMOL/L (ref 22–29)
CREAT SERPL-MCNC: 1.48 MG/DL (ref 0.57–1)
DEPRECATED RDW RBC AUTO: 39.4 FL (ref 37–54)
EGFRCR SERPLBLD CKD-EPI 2021: 35.2 ML/MIN/1.73
ERYTHROCYTE [DISTWIDTH] IN BLOOD BY AUTOMATED COUNT: 12.3 % (ref 12.3–15.4)
GLUCOSE BLDC GLUCOMTR-MCNC: 106 MG/DL (ref 70–130)
GLUCOSE SERPL-MCNC: 88 MG/DL (ref 65–99)
HCT VFR BLD AUTO: 38.8 % (ref 34–46.6)
HGB BLD-MCNC: 13.3 G/DL (ref 12–15.9)
INR PPP: 2.69 (ref 0.9–1.1)
LEFT ATRIUM VOLUME INDEX: 25.6 ML/M2
MAGNESIUM SERPL-MCNC: 2.2 MG/DL (ref 1.6–2.4)
MAXIMAL PREDICTED HEART RATE: 138 BPM
MCH RBC QN AUTO: 30.6 PG (ref 26.6–33)
MCHC RBC AUTO-ENTMCNC: 34.3 G/DL (ref 31.5–35.7)
MCV RBC AUTO: 89.2 FL (ref 79–97)
PLATELET # BLD AUTO: 202 10*3/MM3 (ref 140–450)
PMV BLD AUTO: 10.4 FL (ref 6–12)
POTASSIUM SERPL-SCNC: 4.3 MMOL/L (ref 3.5–5.2)
PROTHROMBIN TIME: 29.1 SECONDS (ref 11.7–14.2)
QT INTERVAL: 398 MS
QT INTERVAL: 416 MS
RBC # BLD AUTO: 4.35 10*6/MM3 (ref 3.77–5.28)
SODIUM SERPL-SCNC: 138 MMOL/L (ref 136–145)
STRESS TARGET HR: 117 BPM
TROPONIN T SERPL HS-MCNC: 24 NG/L
WBC NRBC COR # BLD: 5.15 10*3/MM3 (ref 3.4–10.8)

## 2023-05-28 PROCEDURE — 97161 PT EVAL LOW COMPLEX 20 MIN: CPT

## 2023-05-28 PROCEDURE — 93306 TTE W/DOPPLER COMPLETE: CPT | Performed by: INTERNAL MEDICINE

## 2023-05-28 PROCEDURE — 85610 PROTHROMBIN TIME: CPT | Performed by: NURSE PRACTITIONER

## 2023-05-28 PROCEDURE — 83735 ASSAY OF MAGNESIUM: CPT | Performed by: NURSE PRACTITIONER

## 2023-05-28 PROCEDURE — G0378 HOSPITAL OBSERVATION PER HR: HCPCS

## 2023-05-28 PROCEDURE — 93005 ELECTROCARDIOGRAM TRACING: CPT | Performed by: NURSE PRACTITIONER

## 2023-05-28 PROCEDURE — 85027 COMPLETE CBC AUTOMATED: CPT | Performed by: NURSE PRACTITIONER

## 2023-05-28 PROCEDURE — 80048 BASIC METABOLIC PNL TOTAL CA: CPT | Performed by: NURSE PRACTITIONER

## 2023-05-28 PROCEDURE — 84484 ASSAY OF TROPONIN QUANT: CPT | Performed by: NURSE PRACTITIONER

## 2023-05-28 PROCEDURE — 99214 OFFICE O/P EST MOD 30 MIN: CPT | Performed by: STUDENT IN AN ORGANIZED HEALTH CARE EDUCATION/TRAINING PROGRAM

## 2023-05-28 PROCEDURE — 93010 ELECTROCARDIOGRAM REPORT: CPT | Performed by: STUDENT IN AN ORGANIZED HEALTH CARE EDUCATION/TRAINING PROGRAM

## 2023-05-28 PROCEDURE — 25510000001 PERFLUTREN (DEFINITY) 8.476 MG IN SODIUM CHLORIDE (PF) 0.9 % 10 ML INJECTION: Performed by: NURSE PRACTITIONER

## 2023-05-28 PROCEDURE — 93306 TTE W/DOPPLER COMPLETE: CPT

## 2023-05-28 PROCEDURE — 82948 REAGENT STRIP/BLOOD GLUCOSE: CPT

## 2023-05-28 RX ORDER — DILTIAZEM HYDROCHLORIDE 120 MG/1
120 TABLET, FILM COATED ORAL ONCE
Status: COMPLETED | OUTPATIENT
Start: 2023-05-28 | End: 2023-05-28

## 2023-05-28 RX ADMIN — Medication 1000 MCG: at 08:42

## 2023-05-28 RX ADMIN — ASPIRIN 81 MG: 81 TABLET, COATED ORAL at 08:42

## 2023-05-28 RX ADMIN — SACUBITRIL AND VALSARTAN 1 TABLET: 49; 51 TABLET, FILM COATED ORAL at 08:42

## 2023-05-28 RX ADMIN — HYDROCODONE BITARTRATE AND ACETAMINOPHEN 1 TABLET: 5; 325 TABLET ORAL at 00:22

## 2023-05-28 RX ADMIN — DILTIAZEM HYDROCHLORIDE 120 MG: 120 TABLET, FILM COATED ORAL at 16:20

## 2023-05-28 RX ADMIN — EMPAGLIFLOZIN 10 MG: 10 TABLET, FILM COATED ORAL at 08:42

## 2023-05-28 RX ADMIN — SPIRONOLACTONE 12.5 MG: 25 TABLET ORAL at 08:38

## 2023-05-28 RX ADMIN — Medication 400 UNITS: at 08:42

## 2023-05-28 RX ADMIN — DOXYCYCLINE 100 MG: 100 CAPSULE ORAL at 08:42

## 2023-05-28 RX ADMIN — BUMETANIDE 4 MG: 2 TABLET ORAL at 08:42

## 2023-05-28 RX ADMIN — BUMETANIDE 4 MG: 2 TABLET ORAL at 16:21

## 2023-05-28 RX ADMIN — FERROUS SULFATE TAB 325 MG (65 MG ELEMENTAL FE) 325 MG: 325 (65 FE) TAB at 08:42

## 2023-05-28 RX ADMIN — PANTOPRAZOLE SODIUM 40 MG: 40 TABLET, DELAYED RELEASE ORAL at 05:22

## 2023-05-28 RX ADMIN — METOPROLOL SUCCINATE 100 MG: 50 TABLET, FILM COATED, EXTENDED RELEASE ORAL at 08:42

## 2023-05-28 NOTE — CONSULTS
Date of Hospital Visit: 23  Encounter Provider: Magen Martines MD  Place of Service: [facility] CARDIOLOGY  Patient Name: Linda Martel  :1940  Referral Provider: Talib Kirkland MD    Chief complaint: Exertional dyspnea    History of Present Illness:  Reverend Martel is an 83-year-old patient of Dr. Mejia's in our office with chronic HFrEF with recovered EF secondary to nonischemic cardiomyopathy, permanent atrial fibrillation, hypertension, hyperlipidemia, type 2 diabetes, JUANITA, and history of GI bleeding on NSAIDs who presented with exertional shortness of breath.  Patient was seen in the office on May 19, 2023 with Cely Garnett and at that time was noted to be slightly hypotensive so her she was instructed to hold her diltiazem.  She had continued to do so and noticed dyspnea on exertion that was related to elevated heart rates.    On arrival to the ED, she was afebrile, pulse 100s with at times increases to 120s, respiratory rate 16, blood pressure 127/86, O2 saturation 97% on room air.Initial EKG with atrial fibrillation.  Initial chest x-ray with mild pulmonary congestion.  She was given Lasix 20 mg IV x1 yesterday afternoon for restarted.  Chest x-ray later on in the day, was essentially normal.  She notes that her breathing has mildly improved.      Past Medical History:   Diagnosis Date   • Atrial fibrillation    • CHF (congestive heart failure)    • Diabetes mellitus    • Hyperlipidemia    • Hypertension    • IBS (irritable bowel syndrome)    • Joint infection 2020    PROPHALITIC ANTIBX   • JUANITA (obstructive sleep apnea)    • Pulmonary hypertension        Past Surgical History:   Procedure Laterality Date   • APPENDECTOMY     • BACK SURGERY     • CARDIAC CATHETERIZATION N/A 2017    Procedure: Coronary angiography;  Surgeon: Jacques Stern MD;  Location: [facility] INVASIVE LOCATION;  Service:    • CARDIAC CATHETERIZATION N/A 2018     Procedure: Right Heart Cath with vasodilator challenge  Dr. elliott to perform;  Surgeon: Jacques Elliott MD;  Location:  [facility] CATH INVASIVE LOCATION;  Service: Cardiovascular   • CARDIAC CATHETERIZATION N/A 06/06/2018    Procedure: Left Heart Cath;  Surgeon: Jacques Elliott MD;  Location:  [facility] CATH INVASIVE LOCATION;  Service: Cardiovascular   • CARDIAC CATHETERIZATION N/A 07/08/2020    Procedure: Right and Left Heart Cath;  Surgeon: Saima Andrade MD;  Location:  [facility] CATH INVASIVE LOCATION;  Service: Cardiovascular;  Laterality: N/A;  cors     • CARDIAC CATHETERIZATION N/A 07/08/2020    Procedure: Coronary angiography;  Surgeon: Saima Andrade MD;  Location:  [facility] CATH INVASIVE LOCATION;  Service: Cardiovascular;  Laterality: N/A;   • COLONOSCOPY     • EYE SURGERY Bilateral     CATARACTS   • GALLBLADDER SURGERY     • HIP SURGERY Left    • KNEE SURGERY Bilateral    • TONSILLECTOMY     • TOTAL HIP ARTHROPLASTY Right 5/2/2022    Procedure: TOTAL HIP ARTHROPLASTY ANTERIOR WITH HANA TABLE;  Surgeon: Gio Mays II, MD;  Location: [facility] MAIN OR;  Service: Orthopedics;  Laterality: Right;   • TOTAL KNEE ARTHROPLASTY Left 09/01/2019    Procedure: TOTAL KNEE ARTHROPLASTY, I&D with liner exchange;  Surgeon: Gio Mays II, MD;  Location: [facility] MAIN OR;  Service: Orthopedics       Medications Prior to Admission   Medication Sig Dispense Refill Last Dose   • bumetanide (Bumex) 2 MG tablet Take 2 tablets by mouth 2 (Two) Times a Day. 180 tablet 3 5/27/2023   • Clotrimazole 1 % ointment Apply 1 application topically 2 (Two) Times a Day. 1 each 3 Past Week   • dilTIAZem CD (CARDIZEM CD) 120 MG 24 hr capsule Take 1 capsule by mouth Daily. 90 capsule 2 5/26/2023   • doxycycline (DORYX) 100 MG enteric coated tablet Take 1 tablet by mouth 2 (Two) Times a Day. 180 tablet 6 5/27/2023   • empagliflozin (Jardiance) 10 MG tablet tablet Take 1 tablet by mouth Daily. 30 tablet 0 5/27/2023   • ferrous  sulfate 325 (65 FE) MG tablet 1 tab PO daily Monday, Wednesday, Friday 90 tablet 2 5/27/2023   • HYDROcodone-acetaminophen (Norco) 5-325 MG per tablet Take 1 tablet by mouth Every 6 (Six) Hours As Needed for Mild Pain . 30 tablet 0 5/26/2023   • hydrocortisone 2.5 % cream Apply 1 application topically to the appropriate area as directed 2 (Two) Times a Day. 15 g 1 5/26/2023   • Insulin Glargine (LANTUS SOLOSTAR) 100 UNIT/ML injection pen Inject 15 Units under the skin into the appropriate area as directed Every Night. 6 mL 6 5/26/2023   • metoprolol succinate XL (TOPROL-XL) 100 MG 24 hr tablet Take 1 tablet by mouth 2 (Two) Times a Day. 180 tablet 2 5/27/2023   • omeprazole (priLOSEC) 40 MG capsule Take 1 capsule by mouth As Needed (as needed for stomach upset). 90 capsule 2 5/27/2023   • Ondansetron HCl (ZOFRAN PO) Take 4 mg by mouth Every 4 (Four) Hours As Needed (as needed for nausea).   Past Week   • Probiotic Product (PROBIOTIC PO) Take 1 tablet by mouth 2 (Two) Times a Day.   5/27/2023   • prochlorperazine (COMPAZINE) 10 MG tablet TAKE ONE TABLET BY MOUTH EVERY 6 HOURS AS NEEDED FOR NAUSEA AND VOMITING 30 tablet 6 Past Month   • sacubitril-valsartan (ENTRESTO) 49-51 MG tablet Take 1 tablet by mouth 2 (Two) Times a Day. 180 tablet 3 5/27/2023   • senna (SENOKOT) 8.6 MG tablet Take 2 tablets by mouth At Night As Needed (as needed for constipation). At bedtime   5/26/2023   • spironolactone (ALDACTONE) 25 MG tablet Take 0.5 tablets by mouth Daily. 45 tablet 3 5/26/2023   • vitamin B-12 (CYANOCOBALAMIN) 1000 MCG tablet TAKE ONE TABLET BY MOUTH DAILY 90 tablet 2 5/27/2023   • vitamin E 400 UNIT capsule TAKE ONE CAPSULE BY MOUTH DAILY 90 capsule 1 5/27/2023   • warfarin (COUMADIN) 2.5 MG tablet Take 1 tablet by mouth Every Night. 90 tablet 2 5/26/2023   • Lancets (OneTouch Delica Plus [id number]) misc USE TO TEST BLOOD SUGAR 3 TO 4 TIMES DAILY 100 each 12    • metFORMIN (GLUCOPHAGE) 500 MG tablet Take 1 tablet by  mouth 2 (Two) Times a Day With Meals. 180 tablet 1    • nitroglycerin (NITROSTAT) 0.4 MG SL tablet Place 1 tablet under the tongue Every 5 (Five) Minutes As Needed for Chest Pain. Take no more than 3 doses in 15 minutes. 30 tablet 1    • nystatin (MYCOSTATIN) 308699 UNIT/GM powder Apply  topically to the appropriate area as directed 3 (Three) Times a Day. 1 each 3    • potassium chloride 10 MEQ CR tablet Take 1 tablet by mouth Every Night. 90 tablet 2        Current Meds  Scheduled Meds:aspirin, 81 mg, Oral, Daily  bumetanide, 4 mg, Oral, BID  doxycycline, 100 mg, Oral, Q12H  empagliflozin, 10 mg, Oral, Daily  ferrous sulfate, 325 mg, Oral, Daily With Breakfast  insulin glargine, 15 Units, Subcutaneous, Nightly  metoprolol succinate XL, 100 mg, Oral, BID  pantoprazole, 40 mg, Oral, Q AM  potassium chloride, 10 mEq, Oral, Nightly  sacubitril-valsartan, 1 tablet, Oral, BID  sodium chloride, 10 mL, Intravenous, Q12H  spironolactone, 12.5 mg, Oral, Daily  vitamin B-12, 1,000 mcg, Oral, Daily  vitamin E, 400 Units, Oral, Daily      Continuous Infusions:Pharmacy to dose warfarin,       PRN Meds:.•  HYDROcodone-acetaminophen  •  nitroglycerin  •  ondansetron  •  Pharmacy to dose warfarin  •  prochlorperazine  •  [COMPLETED] Insert Peripheral IV **AND** sodium chloride  •  sodium chloride  •  sodium chloride    Allergies as of 05/27/2023 - Reviewed 05/27/2023   Allergen Reaction Noted   • Penicillins Rash 08/30/2016       Social History     Socioeconomic History   • Marital status:    Tobacco Use   • Smoking status: Never     Passive exposure: Never   • Smokeless tobacco: Never   • Tobacco comments:     caffeine use: 1 cup daily.    Vaping Use   • Vaping Use: Never used   Substance and Sexual Activity   • Alcohol use: Not Currently     Alcohol/week: 1.0 standard drink     Types: 1 Glasses of wine per week     Comment: occasional   • Drug use: No   • Sexual activity: Defer       Family History   Problem Relation Age  "of Onset   • Heart disease Mother    • Heart disease Father    • Stroke Father    • Cancer Father    • Colon cancer Neg Hx    • Colon polyps Neg Hx    • Esophageal cancer Neg Hx    • Liver cancer Neg Hx    • Rectal cancer Neg Hx    • Stomach cancer Neg Hx    • Malig Hyperthermia Neg Hx        REVIEW OF SYSTEMS:   12 point ROS was performed and is negative except as outlined in HPI          Objective:   Temp:  [97.2 °F (36.2 °C)-98.2 °F (36.8 °C)] 97.6 °F (36.4 °C)  Heart Rate:  [] 92  Resp:  [16-18] 16  BP: ()/(66-97) 115/66  Body mass index is 45.14 kg/m².  Flowsheet Rows    Flowsheet Row First Filed Value   Admission Height 157.5 cm (62\") Documented at 05/27/2023 1209   Admission Weight 113 kg (250 lb) Documented at 05/27/2023 1715        Vitals:    05/28/23 0745   BP: 115/66   Pulse: 92   Resp: 16   Temp: 97.6 °F (36.4 °C)   SpO2:        GEN: no distress, alert and oriented, obese  HEENT: NACT, EOMI, moist mucous membranes  Lungs: CTAB, no wheezes, rales or rhonchi  CV: Irregularly irregular, normal rate, normal S1, S2, no murmurs, +2 radial pulses b/l  Abdomen: soft, nontender, nondistended, NABS  Extremities: no edema  Skin: no rash, warm, dry  Heme/Lymph: no bruising  Psych: organized thought, normal behavior and affect      Lab Review:   Results from last 7 days   Lab Units 05/28/23  0502 05/27/23  1232   SODIUM mmol/L 138 139   POTASSIUM mmol/L 4.3 5.2   CHLORIDE mmol/L 99 96*   CO2 mmol/L 30.2* 31.0*   BUN mg/dL 35* 40*   CREATININE mg/dL 1.48* 1.51*   CALCIUM mg/dL 9.6 9.6   BILIRUBIN mg/dL  --  0.9   ALK PHOS U/L  --  94   ALT (SGPT) U/L  --  13   AST (SGOT) U/L  --  21   GLUCOSE mg/dL 88 103*     Results from last 7 days   Lab Units 05/28/23  0502 05/27/23  1933 05/27/23  1744   HSTROP T ng/L 24* 28* 21*     Results from last 7 days   Lab Units 05/28/23  0502 05/27/23  1232   WBC 10*3/mm3 5.15 6.51   HEMOGLOBIN g/dL 13.3 14.5   HEMATOCRIT % 38.8 44.5   PLATELETS 10*3/mm3 202 231     Results " from last 7 days   Lab Units 05/28/23  0502 05/27/23  1933 05/27/23  1232   INR  2.69* 2.64* 2.63*     Results from last 7 days   Lab Units 05/28/23  0502   MAGNESIUM mg/dL 2.2             I personally viewed and interpreted the patient's EKG/Telemetry data)      Exertional dyspnea    Assessment and Plan:  #Dyspnea on exertion  #Mild volume overload  #HFrEF secondary to nonischemic cardiomyopathy with improved EF to 46 to 50%  #Permanent atrial fibrillation    83-year-old patient of Dr. Mejia's in our office with chronic HFrEF with recovered EF secondary to nonischemic cardiomyopathy, permanent atrial fibrillation, hypertension, hyperlipidemia, type 2 diabetes, JUANITA, and history of GI bleeding on NSAIDs who presented with exertional shortness of breath, found to be mildly overloaded, now improved after Lasix 20 mg IV x1.    It is possible that her symptoms were secondary to mild volume overload versus symptomatic A-fib with RVR.    - Follow-up echocardiogram, if normal/at baseline, can be discharged home with outpatient follow-up  - Continue home medications for HFrEF  -She has not been hypotensive here, if echo as above is at baseline, she can restart her diltiazem and follow-up      Magen Martines MD  05/28/23  08:29 EDT.

## 2023-05-28 NOTE — PROGRESS NOTES
ED OBSERVATION PROGRESS/DISCHARGE SUMMARY    Date of Admission: 5/27/2023   LOS: 0 days   PCP: Karan Graham MD      Subjective: Reports she is feeling much better this morning    Hospital Outcome:   83-year-old female admitted to the observation unit for further evaluation of exertional dyspnea.  Patient reported she was seen in the cardiology office last week regarding her low blood pressure and was advised to hold her diltiazem for several days.  She has been off of it for 8 days prior to admission.  In the ER, patient was noted to be in chronic A-fib but with minimal exertion her heart rate went into the 130s and became dyspneic.  Cardiology was consulted by the ER provider and was recommended to give a dose of Lasix and will follow in consultation.  Serial troponins noted 23, 21, 28.  Chest x-ray revealed no acute findings.    Echocardiogram performed which showed ejection fraction 41.8% which is stable compared to prior.  She was seen and evaluated by Dr. Murry with the cardiology service who recommends resuming her on her diltiazem as symptoms could certainly have been secondary to mild volume overload and she reports she is feeling better after dose of Lasix.    Seen and evaluated by physical therapy patient able to ambulate with rolling walker for 30 feet.  No acute PT needs identified.  She is anxious for discharge home today which I think is reasonable, will have her follow-up with cardiology in the office.    ROS:  General: no fevers, chills  Respiratory: no cough, dyspnea  Cardiovascular: no chest pain, palpitations  Abdomen: No abdominal pain, nausea, vomiting, or diarrhea  Neurologic: No focal weakness    Objective   Physical Exam:  I have reviewed the vital signs.  Temp:  [97.2 °F (36.2 °C)-97.8 °F (36.6 °C)] 97.5 °F (36.4 °C)  Heart Rate:  [] 94  Resp:  [16-18] 18  BP: ()/(66-97) 119/80  General Appearance:    Alert, cooperative, no distress  Head:    Normocephalic,  atraumatic  Eyes:    Sclerae anicteric  Neck:   Supple, no mass  Lungs: Clear to auscultation bilaterally, respirations unlabored  Heart: Irregularly irregular rhythm without tachycardia, S1 and S2 normal, no murmur, rub or gallop  Abdomen:  Soft, non-tender, bowel sounds active, nondistended  Extremities: No clubbing, cyanosis, or edema to lower extremities  Pulses:  2+ and symmetric in distal lower extremities  Skin: No rashes   Neurologic: Oriented x3, Normal strength to extremities    Results Review:    I have reviewed the labs, radiology results and diagnostic studies.    Results from last 7 days   Lab Units 05/27/23  1232   WBC 10*3/mm3 6.51   HEMOGLOBIN g/dL 14.5   HEMATOCRIT % 44.5   PLATELETS 10*3/mm3 231     Results from last 7 days   Lab Units 05/27/23  1232   SODIUM mmol/L 139   POTASSIUM mmol/L 5.2   CHLORIDE mmol/L 96*   CO2 mmol/L 31.0*   BUN mg/dL 40*   CREATININE mg/dL 1.51*   CALCIUM mg/dL 9.6   BILIRUBIN mg/dL 0.9   ALK PHOS U/L 94   ALT (SGPT) U/L 13   AST (SGOT) U/L 21   GLUCOSE mg/dL 103*     Imaging Results (Last 24 Hours)     Procedure Component Value Units Date/Time    XR Chest PA & Lateral [[id number]] Collected: 05/27/23 1903     Updated: 05/27/23 1908    Narrative:      PA AND LATERAL CHEST RADIOGRAPH     HISTORY: Exertional dyspnea     COMPARISON: 05/27/2023     FINDINGS:  There is cardiomegaly. There is no vascular congestion. No pneumothorax  or definite pleural effusion is seen. There is calcification of the  aorta. There are changes of prior thoracolumbar spinal fusion. There are  some areas of linear scarring within the left midlung. No definite acute  infiltrates are seen.       Impression:      No acute findings.     This report was finalized on 5/27/2023 7:05 PM by Dr. Umu Lawrence M.D.       XR Chest 1 View [[id number]] Collected: 05/27/23 1252     Updated: 05/27/23 1257    Narrative:      XR CHEST 1 VW-     HISTORY:  Shortness of air, cough.     COMPARISON:  Chest  radiograph 10/26/2022     FINDINGS:    2 views of the chest were obtained. Evaluation is limited due to patient  rotation. The cardiac silhouette is mildly enlarged. There is calcific  aortic atherosclerosis. There is mild central pulmonary venous  congestion without overt pulmonary edema. There is a new small left  pleural effusion with mild left basilar airspace opacity, which could  reflect atelectasis or pneumonia in the appropriate clinical setting.  Recommend follow-up PA and lateral chest radiograph. There is partially  imaged fusion hardware in the spine.     This report was finalized on 5/27/2023 12:54 PM by Dr. Vanessa Gray M.D.             I have reviewed the medications.  ---------------------------------------------------------------------------------------------  Assessment & Plan   Assessment/Problem List    Exertional dyspnea      Plan:    Exertional dyspnea  Echocardiogram shows stable ejection fraction  Consult to cardiology  High-sensitivity troponin 27, 23, trend  1 view chest x-ray shows nonspecific findings, 2 view chest x-ray negative acute     Atrial fibrillation  Continue warfarin, metoprolol  INR 2.63 yesterday, 2.69 today  EKG shows atrial fibrillation rate of 101, QTc 44     Chronic kidney disease  Creatinine 1.48 today, baseline 1.3-1.5     Type 2 diabetes  Continue home medications       Disposition: Home    Follow-up after Discharge: PCP and cardiology    This note will serve as a discharge summary      35 minutes have been spent by [facility] providers in the care of this patient while under observation status.      I wore an face mask, eye protection, and gloves during this patient encounter. Patient also wearing a surgical mask. Hand hygeine performed before and after seeing the patient.      Rosaura Westbrook PA-C 05/28/23 05:06 EDT

## 2023-05-28 NOTE — PLAN OF CARE
Goal Outcome Evaluation:   Pt admitted on observation with SOA, has had low BP levels and on monitoring for the soft BP. Pt scheduled for Echo tomorrow.

## 2023-05-28 NOTE — THERAPY EVALUATION
Patient Name: Linda Martel  : 1940    MRN: [id number]                              Today's Date: 2023       Admit Date: 2023    Visit Dx:     ICD-10-CM ICD-9-CM   1. Acute on chronic congestive heart failure, unspecified heart failure type  I50.9 428.0   2. Chronic atrial fibrillation  I48.20 427.31     Patient Active Problem List   Diagnosis   • Permanent atrial fibrillation (CMS/HCC)   • Essential hypertension   • Hyperlipidemia   • Type 2 diabetes mellitus with hyperglycemia, without long-term current use of insulin   • Pulmonary hypertension   • JUANITA (obstructive sleep apnea)   • Infection of prosthetic left knee joint   • Class 2 severe obesity due to excess calories with serious comorbidity and body mass index (BMI) of 38.0 to 38.9 in adult   • Fatty liver disease, nonalcoholic   • HFrEF (heart failure with reduced ejection fraction)   • Chronic right-sided heart failure   • Exertional dyspnea     Past Medical History:   Diagnosis Date   • Atrial fibrillation    • CHF (congestive heart failure)    • Diabetes mellitus    • Hyperlipidemia    • Hypertension    • IBS (irritable bowel syndrome)    • Joint infection 2020    PROPHALITIC ANTIBX   • JUANITA (obstructive sleep apnea)    • Pulmonary hypertension      Past Surgical History:   Procedure Laterality Date   • APPENDECTOMY     • BACK SURGERY     • CARDIAC CATHETERIZATION N/A 2017    Procedure: Coronary angiography;  Surgeon: Jacques Elliott MD;  Location: [facility] CATH INVASIVE LOCATION;  Service:    • CARDIAC CATHETERIZATION N/A 2018    Procedure: Right Heart Cath with vasodilator challenge  Dr. elliott to perform;  Surgeon: Jacques Elliott MD;  Location: [facility] CATH INVASIVE LOCATION;  Service: Cardiovascular   • CARDIAC CATHETERIZATION N/A 2018    Procedure: Left Heart Cath;  Surgeon: Jacques Elliott MD;  Location: [facility] CATH INVASIVE LOCATION;  Service: Cardiovascular   • CARDIAC CATHETERIZATION N/A 2020    Procedure:  Right and Left Heart Cath;  Surgeon: Saima Andrade MD;  Location:  [facility] CATH INVASIVE LOCATION;  Service: Cardiovascular;  Laterality: N/A;  cors     • CARDIAC CATHETERIZATION N/A 07/08/2020    Procedure: Coronary angiography;  Surgeon: Saima Andrade MD;  Location:  [facility] CATH INVASIVE LOCATION;  Service: Cardiovascular;  Laterality: N/A;   • COLONOSCOPY     • EYE SURGERY Bilateral     CATARACTS   • GALLBLADDER SURGERY     • HIP SURGERY Left    • KNEE SURGERY Bilateral    • TONSILLECTOMY     • TOTAL HIP ARTHROPLASTY Right 5/2/2022    Procedure: TOTAL HIP ARTHROPLASTY ANTERIOR WITH HANA TABLE;  Surgeon: Gio Mays II, MD;  Location: [facility] MAIN OR;  Service: Orthopedics;  Laterality: Right;   • TOTAL KNEE ARTHROPLASTY Left 09/01/2019    Procedure: TOTAL KNEE ARTHROPLASTY, I&D with liner exchange;  Surgeon: Gio Mays II, MD;  Location: [facility] MAIN OR;  Service: Orthopedics      General Information     Row Name 05/28/23 1307          Physical Therapy Time and Intention    Document Type evaluation  -RS     Mode of Treatment individual therapy;physical therapy  -RS     Row Name 05/28/23 1307          General Information    Patient Profile Reviewed yes  -RS     Prior Level of Function independent:  -RS     Existing Precautions/Restrictions no known precautions/restrictions  -RS     Row Name 05/28/23 1307          Living Environment    People in Home alone  -RS     Row Name 05/28/23 1307          Home Main Entrance    Number of Stairs, Main Entrance none  -RS     Row Name 05/28/23 1307          Stairs Within Home, Primary    Number of Stairs, Within Home, Primary none  -RS     Row Name 05/28/23 1307          Cognition    Orientation Status (Cognition) oriented x 4  -RS           User Key  (r) = Recorded By, (t) = Taken By, (c) = Cosigned By    Initials Name Provider Type    RS Lupe Pierce PT Physical Therapist               Mobility     Row Name 05/28/23 1307          Bed-Chair  Transfer    Comment, (Bed-Chair Transfer) UIC  -RS     Row Name 05/28/23 1307          Sit-Stand Transfer    Sit-Stand Cobb (Transfers) standby assist  -RS     Assistive Device (Sit-Stand Transfers) walker, front-wheeled  -RS     Row Name 05/28/23 1307          Gait/Stairs (Locomotion)    Cobb Level (Gait) standby assist  -RS     Assistive Device (Gait) walker, front-wheeled  -RS     Distance in Feet (Gait) 30  -RS     Deviations/Abnormal Patterns (Gait) denton decreased  -RS           User Key  (r) = Recorded By, (t) = Taken By, (c) = Cosigned By    Initials Name Provider Type    RS Lupe Pierce PT Physical Therapist               Obj/Interventions     Row Name 05/28/23 1307          Range of Motion Comprehensive    General Range of Motion no range of motion deficits identified  -RS     Row Name 05/28/23 1307          Strength Comprehensive (MMT)    General Manual Muscle Testing (MMT) Assessment no strength deficits identified  -RS     Row Name 05/28/23 1307          Balance    Balance Assessment sitting static balance;standing static balance;standing dynamic balance  -RS     Static Sitting Balance independent  -RS     Static Standing Balance independent  -RS     Dynamic Standing Balance standby assist  -RS     Row Name 05/28/23 1307          Sensory Assessment (Somatosensory)    Sensory Assessment (Somatosensory) sensation intact  -RS           User Key  (r) = Recorded By, (t) = Taken By, (c) = Cosigned By    Initials Name Provider Type    RS Lupe Pierce PT Physical Therapist               Goals/Plan    No documentation.                Clinical Impression     Row Name 05/28/23 1308          Pain    Pretreatment Pain Rating 0/10 - no pain  -RS     Row Name 05/28/23 1308          Plan of Care Review    Plan of Care Reviewed With patient  -RS     Progress improving  -RS     Outcome Evaluation Pt is an 83 y.o. female admitted to [facility] with c/o SOA on 5/27/2023.  Pt required SBA for STS  transfers, and SBA for ambulation with RW for 30 ft. Pt lives alone and reports no steps to enter. Based on current functional mobility, pt demonstrate no acute PT needs. Anticipate pt will D/C to home.  -RS     Row Name 05/28/23 1308          Therapy Assessment/Plan (PT)    Patient/Family Therapy Goals Statement (PT) go home  -RS     Criteria for Skilled Interventions Met (PT) no;no problems identified which require skilled intervention  -RS           User Key  (r) = Recorded By, (t) = Taken By, (c) = Cosigned By    Initials Name Provider Type    RS Lupe Pierce PT Physical Therapist               Outcome Measures     Row Name 05/28/23 1308 05/28/23 0842       How much help from another person do you currently need...    Turning from your back to your side while in flat bed without using bedrails? 4  -RS 4  -RM    Moving from lying on back to sitting on the side of a flat bed without bedrails? 4  -RS 3  -RM    Moving to and from a bed to a chair (including a wheelchair)? 4  -RS 3  -RM    Standing up from a chair using your arms (e.g., wheelchair, bedside chair)? 4  -RS 3  -RM    Climbing 3-5 steps with a railing? 3  -RS 3  -RM    To walk in hospital room? 4  -RS 3  -RM    AM-PAC 6 Clicks Score (PT) 23  -RS 19  -RM    Highest level of mobility 7 --> Walked 25 feet or more  -RS 6 --> Walked 10 steps or more  -RM    Row Name 05/28/23 1308          Functional Assessment    Outcome Measure Options AM-PAC 6 Clicks Basic Mobility (PT)  -RS           User Key  (r) = Recorded By, (t) = Taken By, (c) = Cosigned By    Initials Name Provider Type    RS Lupe Pierce, PT Physical Therapist    Gio Rivera, RN Registered Nurse                             Physical Therapy Education     Title: PT OT SLP Therapies (Done)     Topic: Physical Therapy (Done)     Point: Mobility training (Done)     Learning Progress Summary           Patient CHAVO Viramontes D, VU by RS at 5/28/2023 1308                   Point: Home exercise  program (Done)     Learning Progress Summary           Patient Eager, E,D, VU by  at 5/28/2023 1308                   Point: Body mechanics (Done)     Learning Progress Summary           Patient Eager, E,D, VU by  at 5/28/2023 1308                   Point: Precautions (Done)     Learning Progress Summary           Patient Eager, E,D, VU by  at 5/28/2023 1308                               User Key     Initials Effective Dates Name Provider Type Discipline     06/16/21 -  Lupe Pierce, ARTHUR Physical Therapist PT              PT Recommendation and Plan     Plan of Care Reviewed With: patient  Progress: improving  Outcome Evaluation: Pt is an 83 y.o. female admitted to [facility] with c/o SOA on 5/27/2023.  Pt required SBA for STS transfers, and SBA for ambulation with RW for 30 ft. Pt lives alone and reports no steps to enter. Based on current functional mobility, pt demonstrate no acute PT needs. Anticipate pt will D/C to home.     Time Calculation:    PT Charges     Row Name 05/28/23 1309             Time Calculation    Start Time 1134  -RS      Stop Time 1144  -      Time Calculation (min) 10 min  -      PT Received On 05/28/23  -            User Key  (r) = Recorded By, (t) = Taken By, (c) = Cosigned By    Initials Name Provider Type     Lupe Pierce PT Physical Therapist              Therapy Charges for Today     Code Description Service Date Service Provider Modifiers Qty    59918281482 HC PT EVAL LOW COMPLEXITY 1 5/28/2023 Lupe Pierce PT GP 1          PT G-Codes  Outcome Measure Options: AM-PAC 6 Clicks Basic Mobility (PT)  AM-PAC 6 Clicks Score (PT): 23  PT Discharge Summary  Anticipated Discharge Disposition (PT): home with assist, home    Lupe Pierce PT  5/28/2023

## 2023-05-28 NOTE — PLAN OF CARE
Goal Outcome Evaluation:  Plan of Care Reviewed With: patient        Progress: improving  Outcome Evaluation: Pt is an 83 y.o. female admitted to [facility] with c/o SOA on 5/27/2023.  Pt required SBA for STS transfers, and SBA for ambulation with RW for 30 ft. Pt lives alone and reports no steps to enter. Based on current functional mobility, pt demonstrate no acute PT needs. Anticipate pt will D/C to home.

## 2023-05-28 NOTE — PLAN OF CARE
Goal Outcome Evaluation:CLEARED FOR DISCHARGE.  IV REMOVED.  PAPERWORK REVIEWED

## 2023-05-29 ENCOUNTER — TRANSITIONAL CARE MANAGEMENT TELEPHONE ENCOUNTER (OUTPATIENT)
Dept: CALL CENTER | Facility: HOSPITAL | Age: 83
End: 2023-05-29

## 2023-05-29 NOTE — OUTREACH NOTE
Call Center TCM Note    Flowsheet Row Responses   [facility] patient discharged from? [place]   Does the patient have one of the following disease processes/diagnoses(primary or secondary)? Other   TCM attempt successful? Yes  [no verbal release]   Call start time 1035   Call end time 1039   Discharge diagnosis Exertional dyspnea   Meds reviewed with patient/caregiver? Yes   Is the patient having any side effects they believe may be caused by any medication additions or changes? No   Does the patient have all medications ordered at discharge? N/A   Is the patient taking all medications as directed (includes completed medication regime)? Yes   Medication comments no questions about medications   Comments PCP FOLLOW UP APPOINTMENT IS 5/30/23@100pm   Does the patient have an appointment with their PCP within 7 days of discharge? Yes   Has home health visited the patient within 72 hours of discharge? N/A   Psychosocial issues? No   Did the patient receive a copy of their discharge instructions? Yes   Nursing interventions Reviewed instructions with patient   What is the patient's perception of their health status since discharge? Improving  [reports she is doing well---reports she has a good understanding of disease process but needs reminded at times. V/U to weigh QD and wt this am 208#, denies edema and SOA. Aware to follow diet as ordered. No questions for this Rn.]   Is the patient/caregiver able to teach back signs and symptoms related to disease process for when to call PCP? Yes   Is the patient/caregiver able to teach back signs and symptoms related to disease process for when to call 911? Yes   TCM call completed? Yes   Call end time 1039          Debbie Armenta RN    5/29/2023, 10:43 EDT

## 2023-05-30 ENCOUNTER — TELEPHONE (OUTPATIENT)
Dept: INTERNAL MEDICINE | Facility: CLINIC | Age: 83
End: 2023-05-30

## 2023-05-30 ENCOUNTER — OFFICE VISIT (OUTPATIENT)
Dept: INTERNAL MEDICINE | Facility: CLINIC | Age: 83
End: 2023-05-30

## 2023-05-30 VITALS
WEIGHT: 215 LBS | HEART RATE: 71 BPM | DIASTOLIC BLOOD PRESSURE: 70 MMHG | SYSTOLIC BLOOD PRESSURE: 118 MMHG | HEIGHT: 62 IN | OXYGEN SATURATION: 97 % | RESPIRATION RATE: 18 BRPM | TEMPERATURE: 97.9 F | BODY MASS INDEX: 39.56 KG/M2

## 2023-05-30 DIAGNOSIS — I50.20 HFREF (HEART FAILURE WITH REDUCED EJECTION FRACTION): ICD-10-CM

## 2023-05-30 DIAGNOSIS — E11.65 TYPE 2 DIABETES MELLITUS WITH HYPERGLYCEMIA, WITHOUT LONG-TERM CURRENT USE OF INSULIN: ICD-10-CM

## 2023-05-30 DIAGNOSIS — I48.21 PERMANENT ATRIAL FIBRILLATION: Primary | ICD-10-CM

## 2023-05-30 NOTE — PROGRESS NOTES
"Chief Complaint  Congestive Heart Failure (Hospital follow up )    Subjective        Linda Martel presents to [facility]  History of Present Illness  Here with progressive sob, went to ED 5/27; her dilt was stopped 5/19 2/2 hypotension, never restarted; began to have progressive sob, weight gain; went to ED, overnight stay, lasix IV x1 with resumption of dilt, echo at baseline    Since discharge she feels well, is near 210 on her home scale again and feels at baseline      Objective   Vital Signs:  /70   Pulse 71   Temp 97.9 °F (36.6 °C)   Resp 18   Ht 157.5 cm (62\")   Wt 97.5 kg (215 lb)   SpO2 97%   BMI 39.32 kg/m²   Estimated body mass index is 39.32 kg/m² as calculated from the following:    Height as of this encounter: 157.5 cm (62\").    Weight as of this encounter: 97.5 kg (215 lb).             Physical Exam  Vitals and nursing note reviewed.   Constitutional:       Appearance: Normal appearance.   HENT:      Head: Normocephalic and atraumatic.      Right Ear: Tympanic membrane, ear canal and external ear normal.      Left Ear: Tympanic membrane, ear canal and external ear normal.      Nose: Nose normal.      Mouth/Throat:      Mouth: Mucous membranes are moist.      Pharynx: Oropharynx is clear.   Eyes:      Extraocular Movements: Extraocular movements intact.      Conjunctiva/sclera: Conjunctivae normal.      Pupils: Pupils are equal, round, and reactive to light.   Cardiovascular:      Rate and Rhythm: Normal rate. Rhythm irregular.      Pulses: Normal pulses.      Heart sounds: Normal heart sounds.   Pulmonary:      Effort: Pulmonary effort is normal.      Breath sounds: Normal breath sounds. No wheezing, rhonchi or rales.   Abdominal:      General: Abdomen is flat. There is no distension.      Palpations: Abdomen is soft.      Tenderness: There is no abdominal tenderness.   Musculoskeletal:         General: Normal range of motion.      " Cervical back: Normal range of motion and neck supple.      Right lower leg: No edema.      Left lower leg: No edema.   Skin:     General: Skin is warm and dry.      Capillary Refill: Capillary refill takes less than 2 seconds.      Findings: No rash.   Neurological:      General: No focal deficit present.      Mental Status: She is alert and oriented to person, place, and time. Mental status is at baseline.   Psychiatric:         Mood and Affect: Mood normal.         Behavior: Behavior normal.        Result Review :                   Assessment and Plan   Diagnoses and all orders for this visit:    1. Permanent atrial fibrillation (Primary)    2. HFrEF (heart failure with reduced ejection fraction)    3. Type 2 diabetes mellitus with hyperglycemia, without long-term current use of insulin  -     Hemoglobin A1c  -     Lipid Panel With / Chol / HDL Ratio  -     Microalbumin / Creatinine Urine Ratio - Urine, Clean Catch    - check labs as above  - adjust meds for DM to goal  - as for sob agree 2/2 HF exacerbation/volume overload, do suspect this was 2/2 afib uncontrolled in setting of dilt cessation; will have close monitoring, instructed her to call us with any vital sign changes, discussed values and ranges today  - continue current goal directed meds, doing well with HF, keep home weights         Follow Up   No follow-ups on file.  Patient was given instructions and counseling regarding her condition or for health maintenance advice. Please see specific information pulled into the AVS if appropriate.

## 2023-05-30 NOTE — TELEPHONE ENCOUNTER
Talked to Pt' daughter about her appt on 5/30/2023 she did not show up on and informed her it was a no show.

## 2023-05-31 LAB
CHOLEST SERPL-MCNC: 123 MG/DL (ref 100–199)
CHOLEST/HDLC SERPL: 3.4 RATIO (ref 0–4.4)
HBA1C MFR BLD: 6.1 % (ref 4.8–5.6)
HDLC SERPL-MCNC: 36 MG/DL
LDLC SERPL CALC-MCNC: 51 MG/DL (ref 0–99)
TRIGL SERPL-MCNC: 222 MG/DL (ref 0–149)
UNABLE TO VOID: NORMAL
VLDLC SERPL CALC-MCNC: 36 MG/DL (ref 5–40)

## 2023-06-04 DIAGNOSIS — L30.4 INTERTRIGO: ICD-10-CM

## 2023-06-05 RX ORDER — NYSTATIN 100000 [USP'U]/G
POWDER TOPICAL
Qty: 60 EACH | Refills: 2 | Status: SHIPPED | OUTPATIENT
Start: 2023-06-05

## 2023-06-05 NOTE — TELEPHONE ENCOUNTER
Rx Refill Note  Requested Prescriptions     Pending Prescriptions Disp Refills    nystatin (MYCOSTATIN) [id number] UNIT/GM powder [Pharmacy Med Name: Nystatin 100,000units/g Topical Powder] 60 each 2     Sig: Apply topically to the appropriate area three times daily as directed.      Last office visit with prescribing clinician: 5/30/2023   Last telemedicine visit with prescribing clinician: Visit date not found   Next office visit with prescribing clinician: Visit date not found                         Would you like a call back once the refill request has been completed: [] Yes [] No    If the office needs to give you a call back, can they leave a voicemail: [] Yes [] No    Jared Doe MA  06/05/23, 08:19 EDT

## 2023-06-06 ENCOUNTER — READMISSION MANAGEMENT (OUTPATIENT)
Dept: CALL CENTER | Facility: HOSPITAL | Age: 83
End: 2023-06-06
Payer: MEDICARE

## 2023-06-06 NOTE — OUTREACH NOTE
Medical Week 2 Survey      Flowsheet Row Responses   [facility] patient discharged from? [place]   Does the patient have one of the following disease processes/diagnoses(primary or secondary)? Other   Week 2 attempt successful? Yes   Call start time 1038   Discharge diagnosis Exertional dyspnea   Call end time 1040   Meds reviewed with patient/caregiver? Yes   Is the patient taking all medications as directed (includes completed medication regime)? Yes   Does the patient have a primary care provider?  Yes   Does the patient have an appointment with their PCP within 7 days of discharge? Yes   Has the patient kept scheduled appointments due by today? Yes   Comments Cardio next week   Has home health visited the patient within 72 hours of discharge? N/A   Psychosocial issues? No   Did the patient receive a copy of their discharge instructions? Yes   Nursing interventions Reviewed instructions with patient   What is the patient's perception of their health status since discharge? Improving   Is the patient/caregiver able to teach back signs and symptoms related to disease process for when to call PCP? Yes   Is the patient/caregiver able to teach back signs and symptoms related to disease process for when to call 911? Yes   Is the patient/caregiver able to teach back the hierarchy of who to call/visit for symptoms/problems? PCP, Specialist, Home health nurse, Urgent Care, ED, 911 Yes   Week 2 Call Completed? Yes   Wrap up additional comments Pt reports she is doing great and feels well. No needs            NUVIA DAIGLE - Registered Nurse

## 2023-06-13 ENCOUNTER — OFFICE VISIT (OUTPATIENT)
Dept: CARDIOLOGY | Facility: CLINIC | Age: 83
End: 2023-06-13
Payer: MEDICARE

## 2023-06-13 VITALS
OXYGEN SATURATION: 97 % | WEIGHT: 217 LBS | BODY MASS INDEX: 39.93 KG/M2 | DIASTOLIC BLOOD PRESSURE: 60 MMHG | SYSTOLIC BLOOD PRESSURE: 98 MMHG | HEIGHT: 62 IN | HEART RATE: 75 BPM

## 2023-06-13 DIAGNOSIS — I48.21 PERMANENT ATRIAL FIBRILLATION: ICD-10-CM

## 2023-06-13 DIAGNOSIS — I50.20 HFREF (HEART FAILURE WITH REDUCED EJECTION FRACTION): Primary | ICD-10-CM

## 2023-06-13 DIAGNOSIS — I10 ESSENTIAL HYPERTENSION: ICD-10-CM

## 2023-06-13 NOTE — PROGRESS NOTES
Subjective:     Encounter Date:06/13/2023      Patient ID: Linda Martel is a 83 y.o. female.    Chief Complaint:  ER Follow up    HPI:   83 y.o. female, seen by Dr. Mejia, with chronic HFrEF with recovered EF secondary to nonischemic cardiomyopathy, permanent atrial fibrillation, hypertension, hyperlipidemia, type 2 diabetes, JUANITA, and history of GI bleeding on NSAIDs who presents for ER follow up. Patient presented to the ED on 5/28/23 with exertional shortness of breath.  She had been seen in the office on May 19, 2023 with Cely Garnett and at that time was noted to be slightly hypotensive so her she was instructed to hold her diltiazem.  She had continued to do so and noticed dyspnea on exertion that was related to elevated heart rates. In the ED she was noted to be in AF with RVR with mild pulmonary edema. She was given IV Lasix and her diltiazem was restarted.    Echocardiogram at that time revealed an EF of 46 to 50% with mildly reduced RV SF, severely dilated LA and RA, mild aortic valve stenosis.    Since discharge, patient has felt back to baseline.  She notes intermittent shortness of breath but this has significantly improved from prior.  Her blood pressures have also improved and she denies any dizziness, chest pressure, palpitations, easy fatigability.    The following portions of the patient's history were reviewed and updated as appropriate: allergies, current medications, past family history, past medical history, past social history, past surgical history and problem list.     REVIEW OF SYSTEMS:   All systems reviewed.  Pertinent positives identified in HPI.  All other systems are negative.    Past Medical History:   Diagnosis Date    Atrial fibrillation     CHF (congestive heart failure)     Diabetes mellitus     Hyperlipidemia     Hypertension     IBS (irritable bowel syndrome)     Joint infection 2020    PROPHALITIC ANTIBX    JUANITA (obstructive sleep apnea)     Pulmonary  hypertension        Family History   Problem Relation Age of Onset    Heart disease Mother     Heart disease Father     Stroke Father     Cancer Father     Colon cancer Neg Hx     Colon polyps Neg Hx     Esophageal cancer Neg Hx     Liver cancer Neg Hx     Rectal cancer Neg Hx     Stomach cancer Neg Hx     Malig Hyperthermia Neg Hx        Social History     Socioeconomic History    Marital status:    Tobacco Use    Smoking status: Never     Passive exposure: Never    Smokeless tobacco: Never    Tobacco comments:     caffeine use: 1 cup daily.    Vaping Use    Vaping Use: Never used   Substance and Sexual Activity    Alcohol use: Not Currently     Alcohol/week: 1.0 standard drink     Types: 1 Glasses of wine per week     Comment: occasional    Drug use: No    Sexual activity: Defer       Allergies   Allergen Reactions    Penicillins Rash       Past Surgical History:   Procedure Laterality Date    APPENDECTOMY      BACK SURGERY      CARDIAC CATHETERIZATION N/A 06/01/2017    Procedure: Coronary angiography;  Surgeon: Jacques Elliott MD;  Location:  [facility];  Service:     CARDIAC CATHETERIZATION N/A 06/06/2018    Procedure: Right Heart Cath with vasodilator challenge  Dr. elliott to perform;  Surgeon: Jacques Elliott MD;  Location:  [facility];  Service: Cardiovascular    CARDIAC CATHETERIZATION N/A 06/06/2018    Procedure: Left Heart Cath;  Surgeon: Jacques Elliott MD;  Location:  [facility];  Service: Cardiovascular    CARDIAC CATHETERIZATION N/A 07/08/2020    Procedure: Right and Left Heart Cath;  Surgeon: Saima Andrade MD;  Location:  [facility];  Service: Cardiovascular;  Laterality: N/A;  cors      CARDIAC CATHETERIZATION N/A 07/08/2020    Procedure: Coronary angiography;  Surgeon: Saima Andrade MD;  Location:  [facility];  Service: Cardiovascular;  Laterality: N/A;    COLONOSCOPY      EYE SURGERY Bilateral      CATARACTS    GALLBLADDER SURGERY      HIP SURGERY Left     KNEE SURGERY Bilateral     TONSILLECTOMY      TOTAL HIP ARTHROPLASTY Right 5/2/2022    Procedure: TOTAL HIP ARTHROPLASTY ANTERIOR WITH HANA TABLE;  Surgeon: Gio Mays II, MD;  Location: [facility];  Service: Orthopedics;  Laterality: Right;    TOTAL KNEE ARTHROPLASTY Left 09/01/2019    Procedure: TOTAL KNEE ARTHROPLASTY, I&D with liner exchange;  Surgeon: Gio Mays II, MD;  Location: [facility];  Service: Orthopedics         ECG 12 Lead    Date/Time: 6/13/2023 10:39 AM  Performed by: Magen Martines MD  Authorized by: Magen Martines MD   Comparison: compared with previous ECG from 5/28/2023  Similar to previous ECG  Rhythm: atrial fibrillation  Rate: normal  Conduction: left anterior fascicular block  Other findings: non-specific ST-T wave changes    Clinical impression: abnormal EKG           Objective:         Vitals:    06/13/23 1006   BP: 98/60   Pulse: 75   SpO2: 97%       PHYSICAL EXAM:  GEN: well appearing, in NAD   HEENT: NCAT, EOMI, moist mucus membranes   Respiratory: CTAB, no wheezes, rales or rhonchi  CV: normal rate, regular rhythm, normal S1, S2, no murmurs, rubs, gallops, +2 radial pulses b/l  GI: soft, nontender, nondistended  MSK: no edema  Skin: no rash, warm, dry  Heme/Lymph: no bruising or bleeding  Neuro: Alert and Oriented x 3, grossly normal motor function        Assessment:         (I50.20) HFrEF (heart failure with reduced ejection fraction)    (I10) Essential hypertension    (I48.21) Permanent atrial fibrillation    83 y.o. female, seen by Dr. Mejia, with chronic HFrEF with recovered EF secondary to nonischemic cardiomyopathy, permanent atrial fibrillation, hypertension, hyperlipidemia, type 2 diabetes, JUANITA, and history of GI bleeding on NSAIDs who presents for ER follow up.       Plan:       #HFrEF  Recent echo with EF 46-50%.  - continue Toprol 100 mg twice daily, Entresto 49/51 mg twice  daily, spironolactone 12.5 mg daily, Jardiance 10 mg daily, Bumex 4 mg twice daily    #A-fib  Currently rate controlled.  -Continue metoprolol as above, diltiazem 120 mg daily, warfarin      Dr Graham, thank you very much for referring this kind patient to me. Please call me with any questions or concerns. Patient will follow up with Dr Mejia in a couple of months.         Magen Martines MD  06/13/23  [facility]    Outpatient Encounter Medications as of 6/13/2023   Medication Sig Dispense Refill    bumetanide (Bumex) 2 MG tablet Take 2 tablets by mouth 2 (Two) Times a Day. 180 tablet 3    Clotrimazole 1 % ointment Apply 1 application topically 2 (Two) Times a Day. 1 each 3    dilTIAZem CD (CARDIZEM CD) 120 MG 24 hr capsule Take 1 capsule by mouth Daily. 90 capsule 2    doxycycline (DORYX) 100 MG enteric coated tablet Take 1 tablet by mouth 2 (Two) Times a Day. 180 tablet 6    empagliflozin (Jardiance) 10 MG tablet tablet Take 1 tablet by mouth Daily. 30 tablet 0    ferrous sulfate 325 (65 FE) MG tablet 1 tab PO daily Monday, Wednesday, Friday 90 tablet 2    HYDROcodone-acetaminophen (Norco) 5-325 MG per tablet Take 1 tablet by mouth Every 6 (Six) Hours As Needed for Mild Pain . 30 tablet 0    hydrocortisone 2.5 % cream Apply 1 application topically to the appropriate area as directed 2 (Two) Times a Day. 15 g 1    Insulin Glargine (LANTUS SOLOSTAR) 100 UNIT/ML injection pen Inject 15 Units under the skin into the appropriate area as directed Every Night. 6 mL 6    Lancets (OneTouch Delica Plus [id number]) misc USE TO TEST BLOOD SUGAR 3 TO 4 TIMES DAILY 100 each 12    metFORMIN (GLUCOPHAGE) 500 MG tablet Take 1 tablet by mouth 2 (Two) Times a Day With Meals. 180 tablet 1    metoprolol succinate XL (TOPROL-XL) 100 MG 24 hr tablet Take 1 tablet by mouth 2 (Two) Times a Day. 180 tablet 2    nitroglycerin (NITROSTAT) 0.4 MG SL tablet Place 1 tablet under the tongue Every 5 (Five) Minutes As Needed for  Chest Pain. Take no more than 3 doses in 15 minutes. 30 tablet 1    nystatin (MYCOSTATIN) 764402 UNIT/GM powder Apply topically to the appropriate area three times daily as directed. 60 each 2    omeprazole (priLOSEC) 40 MG capsule Take 1 capsule by mouth As Needed (as needed for stomach upset). 90 capsule 2    Ondansetron HCl (ZOFRAN PO) Take 4 mg by mouth Every 4 (Four) Hours As Needed (as needed for nausea).      potassium chloride 10 MEQ CR tablet Take 1 tablet by mouth Every Night. 90 tablet 2    Probiotic Product (PROBIOTIC PO) Take 1 tablet by mouth 2 (Two) Times a Day.      prochlorperazine (COMPAZINE) 10 MG tablet TAKE ONE TABLET BY MOUTH EVERY 6 HOURS AS NEEDED FOR NAUSEA AND VOMITING 30 tablet 6    sacubitril-valsartan (ENTRESTO) 49-51 MG tablet Take 1 tablet by mouth 2 (Two) Times a Day. 180 tablet 3    senna (SENOKOT) 8.6 MG tablet Take 2 tablets by mouth At Night As Needed (as needed for constipation). At bedtime      spironolactone (ALDACTONE) 25 MG tablet Take 0.5 tablets by mouth Daily. 45 tablet 3    vitamin B-12 (CYANOCOBALAMIN) 1000 MCG tablet TAKE ONE TABLET BY MOUTH DAILY 90 tablet 2    vitamin E 400 UNIT capsule TAKE ONE CAPSULE BY MOUTH DAILY 90 capsule 1    warfarin (COUMADIN) 2.5 MG tablet Take 1 tablet by mouth Every Night. 90 tablet 2     No facility-administered encounter medications on file as of 6/13/2023.

## 2023-06-14 NOTE — TELEPHONE ENCOUNTER
Rx Refill Note  Requested Prescriptions     Pending Prescriptions Disp Refills    warfarin (COUMADIN) 2.5 MG tablet [Pharmacy Med Name: Warfarin Sodium 2.5mg Tablet] 90 tablet 0     Sig: Take 2 tablets by mouth two days per week and take 1 tablet by mouth all other days.      Last office visit with prescribing clinician: 5/30/2023   Last telemedicine visit with prescribing clinician: Visit date not found   Next office visit with prescribing clinician: Visit date not found                         Would you like a call back once the refill request has been completed: [] Yes [] No    If the office needs to give you a call back, can they leave a voicemail: [] Yes [] No    Cady Deluca MA  06/14/23, 15:55 EDT

## 2023-06-15 RX ORDER — WARFARIN SODIUM 2.5 MG/1
TABLET ORAL
Qty: 90 TABLET | Refills: 0 | Status: SHIPPED | OUTPATIENT
Start: 2023-06-15

## 2023-08-03 ENCOUNTER — ANTICOAGULATION VISIT (OUTPATIENT)
Dept: INTERNAL MEDICINE | Facility: CLINIC | Age: 83
End: 2023-08-03
Payer: MEDICARE

## 2023-08-04 ENCOUNTER — OFFICE VISIT (OUTPATIENT)
Dept: CARDIOLOGY | Facility: CLINIC | Age: 83
End: 2023-08-04
Payer: MEDICARE

## 2023-08-04 VITALS
SYSTOLIC BLOOD PRESSURE: 100 MMHG | DIASTOLIC BLOOD PRESSURE: 66 MMHG | HEIGHT: 62 IN | HEART RATE: 82 BPM | BODY MASS INDEX: 39.75 KG/M2 | WEIGHT: 216 LBS

## 2023-08-04 DIAGNOSIS — I10 ESSENTIAL HYPERTENSION: ICD-10-CM

## 2023-08-04 DIAGNOSIS — I50.32 CHRONIC HEART FAILURE WITH PRESERVED EJECTION FRACTION (HFPEF): ICD-10-CM

## 2023-08-04 DIAGNOSIS — I50.812 CHRONIC RIGHT-SIDED HEART FAILURE: Primary | ICD-10-CM

## 2023-08-31 ENCOUNTER — OFFICE VISIT (OUTPATIENT)
Dept: INTERNAL MEDICINE | Facility: CLINIC | Age: 83
End: 2023-08-31
Payer: MEDICARE

## 2023-08-31 VITALS
SYSTOLIC BLOOD PRESSURE: 112 MMHG | HEART RATE: 84 BPM | DIASTOLIC BLOOD PRESSURE: 64 MMHG | HEIGHT: 62 IN | BODY MASS INDEX: 39.93 KG/M2 | OXYGEN SATURATION: 96 % | TEMPERATURE: 97.7 F | RESPIRATION RATE: 20 BRPM | WEIGHT: 217 LBS

## 2023-08-31 DIAGNOSIS — I50.20 HFREF (HEART FAILURE WITH REDUCED EJECTION FRACTION): Primary | ICD-10-CM

## 2023-08-31 DIAGNOSIS — I48.21 PERMANENT ATRIAL FIBRILLATION: ICD-10-CM

## 2023-08-31 PROCEDURE — 3074F SYST BP LT 130 MM HG: CPT | Performed by: INTERNAL MEDICINE

## 2023-08-31 PROCEDURE — 99214 OFFICE O/P EST MOD 30 MIN: CPT | Performed by: INTERNAL MEDICINE

## 2023-08-31 PROCEDURE — 3078F DIAST BP <80 MM HG: CPT | Performed by: INTERNAL MEDICINE

## 2023-08-31 NOTE — TELEPHONE ENCOUNTER
Rx Refill Note  Requested Prescriptions     Pending Prescriptions Disp Refills    hydrocortisone 2.5 % cream 15 g 1     Sig: Apply 1 application  topically to the appropriate area as directed 2 (Two) Times a Day.      Last office visit with prescribing clinician: 8/31/2023   Last telemedicine visit with prescribing clinician: Visit date not found   Next office visit with prescribing clinician: Visit date not found                         Would you like a call back once the refill request has been completed: [] Yes [] No    If the office needs to give you a call back, can they leave a voicemail: [] Yes [] No    Jared Doe MA  08/31/23, 13:11 EDT

## 2023-08-31 NOTE — PROGRESS NOTES
"Chief Complaint  Weight Gain and Shortness of Breath    Subjective        Linda Martel presents to [facility]  History of Present Illness  Here with fatigue, sob; no chest pain; no fevers, no cough, no congestion; weight is up about 4 lbs on her home scale; feels her feet are more swollen; no pnd, no orthopnea    Objective   Vital Signs:  /64   Pulse 84   Temp 97.7 °F (36.5 °C)   Resp 20   Ht 157.5 cm (62\")   Wt 98.4 kg (217 lb)   SpO2 96%   BMI 39.69 kg/m²   Estimated body mass index is 39.69 kg/m² as calculated from the following:    Height as of this encounter: 157.5 cm (62\").    Weight as of this encounter: 98.4 kg (217 lb).             Physical Exam  Vitals and nursing note reviewed.   Constitutional:       Appearance: Normal appearance.   HENT:      Head: Normocephalic and atraumatic.      Right Ear: Tympanic membrane, ear canal and external ear normal.      Left Ear: Tympanic membrane, ear canal and external ear normal.      Nose: Nose normal.      Mouth/Throat:      Mouth: Mucous membranes are moist.      Pharynx: Oropharynx is clear.   Eyes:      Extraocular Movements: Extraocular movements intact.      Conjunctiva/sclera: Conjunctivae normal.      Pupils: Pupils are equal, round, and reactive to light.   Cardiovascular:      Rate and Rhythm: Normal rate and regular rhythm.      Pulses: Normal pulses.      Heart sounds: Normal heart sounds.   Pulmonary:      Effort: Pulmonary effort is normal.      Breath sounds: Normal breath sounds. No wheezing, rhonchi or rales.   Abdominal:      General: Abdomen is flat. There is no distension.      Palpations: Abdomen is soft.      Tenderness: There is no abdominal tenderness.   Musculoskeletal:         General: Normal range of motion.      Cervical back: Normal range of motion and neck supple.      Right lower leg: No edema.      Left lower leg: No edema.   Skin:     General: Skin is warm and dry.      " Capillary Refill: Capillary refill takes less than 2 seconds.      Findings: No rash.   Neurological:      General: No focal deficit present.      Mental Status: She is alert and oriented to person, place, and time. Mental status is at baseline.   Psychiatric:         Mood and Affect: Mood normal.         Behavior: Behavior normal.      Result Review :                   Assessment and Plan   Diagnoses and all orders for this visit:    1. HFrEF (heart failure with reduced ejection fraction) (Primary)    2. Permanent atrial fibrillation    - increase bumex to 6mg bid for 2 days then stop; if not improved will call back  - follow up early next week for renal function  - discussed risks/benefits/alternatives of meds/treatments  9         Follow Up   No follow-ups on file.  Patient was given instructions and counseling regarding her condition or for health maintenance advice. Please see specific information pulled into the AVS if appropriate.

## 2023-09-05 ENCOUNTER — OFFICE VISIT (OUTPATIENT)
Dept: INTERNAL MEDICINE | Facility: CLINIC | Age: 83
End: 2023-09-05
Payer: MEDICARE

## 2023-09-05 VITALS
RESPIRATION RATE: 20 BRPM | HEART RATE: 75 BPM | HEIGHT: 62 IN | TEMPERATURE: 97.5 F | OXYGEN SATURATION: 95 % | SYSTOLIC BLOOD PRESSURE: 114 MMHG | BODY MASS INDEX: 41 KG/M2 | WEIGHT: 222.8 LBS | DIASTOLIC BLOOD PRESSURE: 62 MMHG

## 2023-09-05 DIAGNOSIS — I50.20 HFREF (HEART FAILURE WITH REDUCED EJECTION FRACTION): Primary | ICD-10-CM

## 2023-09-05 LAB
BUN SERPL-MCNC: 32 MG/DL (ref 8–27)
BUN/CREAT SERPL: 24 (ref 12–28)
CALCIUM SERPL-MCNC: 9.6 MG/DL (ref 8.7–10.3)
CHLORIDE SERPL-SCNC: 97 MMOL/L (ref 96–106)
CO2 SERPL-SCNC: 28 MMOL/L (ref 20–29)
CREAT SERPL-MCNC: 1.35 MG/DL (ref 0.57–1)
EGFRCR SERPLBLD CKD-EPI 2021: 39 ML/MIN/1.73
GLUCOSE SERPL-MCNC: 118 MG/DL (ref 70–99)
POTASSIUM SERPL-SCNC: 4.7 MMOL/L (ref 3.5–5.2)
SODIUM SERPL-SCNC: 138 MMOL/L (ref 134–144)

## 2023-09-05 PROCEDURE — 3074F SYST BP LT 130 MM HG: CPT | Performed by: INTERNAL MEDICINE

## 2023-09-05 PROCEDURE — 3078F DIAST BP <80 MM HG: CPT | Performed by: INTERNAL MEDICINE

## 2023-09-05 PROCEDURE — 99214 OFFICE O/P EST MOD 30 MIN: CPT | Performed by: INTERNAL MEDICINE

## 2023-09-07 NOTE — PROGRESS NOTES
"Chief Complaint  Shortness of Breath (Patient states SOB is between \"better and \" about the same as before\")    Subjective        Linda Martel presents to [facility]  History of Present Illness  Here to follow up sob, significantly better, back to baseline but she does note she is up at least 4 lbs, no significant change in diet    Objective   Vital Signs:  /62   Pulse 75   Temp 97.5 °F (36.4 °C)   Resp 20   Ht 157.5 cm (62\")   Wt 101 kg (222 lb 12.8 oz)   SpO2 95%   BMI 40.75 kg/m²   Estimated body mass index is 40.75 kg/m² as calculated from the following:    Height as of this encounter: 157.5 cm (62\").    Weight as of this encounter: 101 kg (222 lb 12.8 oz).             Physical Exam  Vitals and nursing note reviewed.   Constitutional:       Appearance: Normal appearance.   HENT:      Head: Normocephalic and atraumatic.      Right Ear: Tympanic membrane, ear canal and external ear normal.      Left Ear: Tympanic membrane, ear canal and external ear normal.      Nose: Nose normal.      Mouth/Throat:      Mouth: Mucous membranes are moist.      Pharynx: Oropharynx is clear.   Eyes:      Extraocular Movements: Extraocular movements intact.      Conjunctiva/sclera: Conjunctivae normal.      Pupils: Pupils are equal, round, and reactive to light.   Cardiovascular:      Rate and Rhythm: Normal rate and regular rhythm.      Pulses: Normal pulses.      Heart sounds: Normal heart sounds.   Pulmonary:      Effort: Pulmonary effort is normal.      Breath sounds: Normal breath sounds. No wheezing, rhonchi or rales.   Abdominal:      General: Abdomen is flat. There is no distension.      Palpations: Abdomen is soft.      Tenderness: There is no abdominal tenderness.   Musculoskeletal:         General: Normal range of motion.      Cervical back: Normal range of motion and neck supple.      Right lower leg: No edema.      Left lower leg: No edema.   Skin:     " General: Skin is warm and dry.      Capillary Refill: Capillary refill takes less than 2 seconds.      Findings: No rash.   Neurological:      General: No focal deficit present.      Mental Status: She is alert and oriented to person, place, and time. Mental status is at baseline.   Psychiatric:         Mood and Affect: Mood normal.         Behavior: Behavior normal.      Result Review :                   Assessment and Plan   Diagnoses and all orders for this visit:    1. HFrEF (heart failure with reduced ejection fraction) (Primary)  -     Basic metabolic panel    - check labs as above  - continue bumex at 4mg bid after 6mg bid dosing for 2-3 days  - noted weight gain though does have less LE edema, improvement in CHARLES, PND  - close follow up pending above         Follow Up   No follow-ups on file.  Patient was given instructions and counseling regarding her condition or for health maintenance advice. Please see specific information pulled into the AVS if appropriate.

## 2023-09-18 ENCOUNTER — ANTICOAGULATION VISIT (OUTPATIENT)
Dept: INTERNAL MEDICINE | Facility: CLINIC | Age: 83
End: 2023-09-18
Payer: MEDICARE

## 2023-09-18 LAB — INR PPP: 2.2 (ref 2–3)

## 2023-09-19 DIAGNOSIS — I50.20 HFREF (HEART FAILURE WITH REDUCED EJECTION FRACTION): ICD-10-CM

## 2023-09-19 DIAGNOSIS — I27.20 PULMONARY HYPERTENSION: ICD-10-CM

## 2023-09-19 DIAGNOSIS — I10 ESSENTIAL HYPERTENSION: ICD-10-CM

## 2023-09-19 RX ORDER — BUMETANIDE 2 MG/1
TABLET ORAL
Qty: 180 TABLET | Refills: 2 | Status: SHIPPED | OUTPATIENT
Start: 2023-09-19

## 2023-09-19 NOTE — TELEPHONE ENCOUNTER
Rx Refill Note  Requested Prescriptions     Pending Prescriptions Disp Refills    bumetanide (BUMEX) 2 MG tablet [Pharmacy Med Name: Bumetanide 2mg Tablet] 180 tablet 2     Sig: Take 2 tablets by mouth twice daily.      Last office visit with prescribing clinician: 9/5/2023   Last telemedicine visit with prescribing clinician: Visit date not found   Next office visit with prescribing clinician: Visit date not found                         Would you like a call back once the refill request has been completed: [] Yes [] No    If the office needs to give you a call back, can they leave a voicemail: [] Yes [] No    Jared Doe MA  09/19/23, 15:44 EDT

## 2023-11-02 ENCOUNTER — OFFICE VISIT (OUTPATIENT)
Dept: INTERNAL MEDICINE | Facility: CLINIC | Age: 83
End: 2023-11-02
Payer: MEDICARE

## 2023-11-02 VITALS
BODY MASS INDEX: 40.78 KG/M2 | RESPIRATION RATE: 20 BRPM | HEIGHT: 62 IN | HEART RATE: 61 BPM | OXYGEN SATURATION: 98 % | DIASTOLIC BLOOD PRESSURE: 62 MMHG | SYSTOLIC BLOOD PRESSURE: 106 MMHG | WEIGHT: 221.6 LBS

## 2023-11-02 DIAGNOSIS — I48.21 PERMANENT ATRIAL FIBRILLATION: ICD-10-CM

## 2023-11-02 DIAGNOSIS — E11.65 TYPE 2 DIABETES MELLITUS WITH HYPERGLYCEMIA, WITHOUT LONG-TERM CURRENT USE OF INSULIN: ICD-10-CM

## 2023-11-02 RX ORDER — BUMETANIDE 2 MG/1
4 TABLET ORAL 2 TIMES DAILY
Qty: 360 TABLET | Refills: 1 | Status: SHIPPED | OUTPATIENT
Start: 2023-11-02

## 2023-11-02 RX ORDER — POTASSIUM CHLORIDE 750 MG/1
10 TABLET, FILM COATED, EXTENDED RELEASE ORAL NIGHTLY
Qty: 90 TABLET | Refills: 1 | Status: SHIPPED | OUTPATIENT
Start: 2023-11-02

## 2023-11-02 RX ORDER — DILTIAZEM HYDROCHLORIDE 120 MG/1
120 CAPSULE, COATED, EXTENDED RELEASE ORAL DAILY
Qty: 90 CAPSULE | Refills: 1 | Status: SHIPPED | OUTPATIENT
Start: 2023-11-02

## 2023-11-02 NOTE — PROGRESS NOTES
"Chief Complaint  Shortness of Breath    Subjective        Linda Martel presents to [facility]  History of Present Illness  Has been doing well overall. Over the past few weeks she has had several days of SOA, usually improves with taking medications. During these episodes she noticed more swelling around her legs. Denies any CP, palpitations.     Needs refill of diltiazem, KCl, metformin, and Bumex.       Objective   Vital Signs:  /62   Pulse 61   Resp 20   Ht 157.5 cm (62\")   Wt 101 kg (221 lb 9.6 oz)   SpO2 98%   BMI 40.53 kg/m²   Estimated body mass index is 40.53 kg/m² as calculated from the following:    Height as of this encounter: 157.5 cm (62\").    Weight as of this encounter: 101 kg (221 lb 9.6 oz).         Physical Exam  Vitals and nursing note reviewed.   Constitutional:       Appearance: Normal appearance.      Comments: Pleasant in conversation   HENT:      Head: Normocephalic and atraumatic.      Right Ear: External ear normal.      Left Ear: External ear normal.      Nose: Nose normal.      Mouth/Throat:      Mouth: Mucous membranes are moist.      Pharynx: Oropharynx is clear.   Eyes:      Extraocular Movements: Extraocular movements intact.      Conjunctiva/sclera: Conjunctivae normal.   Cardiovascular:      Rate and Rhythm: Normal rate and regular rhythm.      Pulses: Normal pulses.      Heart sounds: Normal heart sounds.   Pulmonary:      Effort: Pulmonary effort is normal.      Breath sounds: Normal breath sounds. No wheezing, rhonchi or rales.   Abdominal:      General: Abdomen is flat. There is no distension.      Palpations: Abdomen is soft.      Tenderness: There is no abdominal tenderness.   Musculoskeletal:         General: Normal range of motion.      Cervical back: Normal range of motion and neck supple.      Right lower leg: No edema.      Left lower leg: No edema.   Skin:     General: Skin is warm and dry.      Capillary " Refill: Capillary refill takes less than 2 seconds.      Findings: No rash.   Neurological:      General: No focal deficit present.      Mental Status: She is alert and oriented to person, place, and time. Mental status is at baseline.   Psychiatric:         Mood and Affect: Mood normal.         Behavior: Behavior normal.        Result Review :  The following data was reviewed by: Karan Graham MD on 11/02/2023:  Common labs          5/30/2023    13:27 9/5/2023    12:06 11/2/2023    15:15   Common Labs   Glucose  118  134    BUN  32  33    Creatinine  1.35  1.27    Sodium  138  143    Potassium  4.7  4.6    Chloride  97  102    Calcium  9.6  9.8    Total Protein   6.3    Albumin   4.4    Total Bilirubin   0.8    Alkaline Phosphatase   94    AST (SGOT)   14    ALT (SGPT)   11    Total Cholesterol 123   184    Triglycerides 222   347    HDL Cholesterol 36   34    LDL Cholesterol  51   92    Hemoglobin A1C 6.1   6.10             Assessment and Plan   Diagnoses and all orders for this visit:    1. Permanent atrial fibrillation  -     dilTIAZem CD (CARDIZEM CD) 120 MG 24 hr capsule; Take 1 capsule by mouth Daily.  Dispense: 90 capsule; Refill: 1  -     Comprehensive Metabolic Panel  -     Lipid Panel With / Chol / HDL Ratio  -     Hemoglobin A1c    2. Type 2 diabetes mellitus with hyperglycemia, without long-term current use of insulin  -     metFORMIN (GLUCOPHAGE) 500 MG tablet; Take 1 tablet by mouth 2 (Two) Times a Day With Meals.  Dispense: 180 tablet; Refill: 1  -     Comprehensive Metabolic Panel  -     Lipid Panel With / Chol / HDL Ratio  -     Hemoglobin A1c    Other orders  -     bumetanide (BUMEX) 2 MG tablet; Take 2 tablets by mouth 2 (Two) Times a Day.  Dispense: 360 tablet; Refill: 1  -     potassium chloride 10 MEQ CR tablet; Take 1 tablet by mouth Every Night.  Dispense: 90 tablet; Refill: 1    - Continue medications as per current regimen, may titrate based on lab results and clinical trajectory  -  Repeat CHF labs today and reevaluate in 3 months, sooner if needed         Follow Up   No follow-ups on file.  Patient was given instructions and counseling regarding her condition or for health maintenance advice. Please see specific information pulled into the AVS if appropriate.

## 2023-11-03 LAB
ALBUMIN SERPL-MCNC: 4.4 G/DL (ref 3.5–5.2)
ALBUMIN/GLOB SERPL: 2.3 G/DL
ALP SERPL-CCNC: 94 U/L (ref 39–117)
ALT SERPL-CCNC: 11 U/L (ref 1–33)
AST SERPL-CCNC: 14 U/L (ref 1–32)
BILIRUB SERPL-MCNC: 0.8 MG/DL (ref 0–1.2)
BUN SERPL-MCNC: 33 MG/DL (ref 8–23)
BUN/CREAT SERPL: 26 (ref 7–25)
CALCIUM SERPL-MCNC: 9.8 MG/DL (ref 8.6–10.5)
CHLORIDE SERPL-SCNC: 102 MMOL/L (ref 98–107)
CHOLEST SERPL-MCNC: 184 MG/DL (ref 0–200)
CHOLEST/HDLC SERPL: 5.41 {RATIO}
CO2 SERPL-SCNC: 31.1 MMOL/L (ref 22–29)
CREAT SERPL-MCNC: 1.27 MG/DL (ref 0.57–1)
EGFRCR SERPLBLD CKD-EPI 2021: 42.3 ML/MIN/1.73
GLOBULIN SER CALC-MCNC: 1.9 GM/DL
GLUCOSE SERPL-MCNC: 134 MG/DL (ref 65–99)
HBA1C MFR BLD: 6.1 % (ref 4.8–5.6)
HDLC SERPL-MCNC: 34 MG/DL (ref 40–60)
LDLC SERPL CALC-MCNC: 92 MG/DL (ref 0–100)
POTASSIUM SERPL-SCNC: 4.6 MMOL/L (ref 3.5–5.2)
PROT SERPL-MCNC: 6.3 G/DL (ref 6–8.5)
SODIUM SERPL-SCNC: 143 MMOL/L (ref 136–145)
TRIGL SERPL-MCNC: 347 MG/DL (ref 0–150)
VLDLC SERPL CALC-MCNC: 58 MG/DL (ref 5–40)

## 2023-11-06 ENCOUNTER — ANTICOAGULATION VISIT (OUTPATIENT)
Dept: INTERNAL MEDICINE | Facility: CLINIC | Age: 83
End: 2023-11-06
Payer: MEDICARE

## 2023-11-06 LAB — INR PPP: 1.9 (ref 2–3)

## 2023-11-07 ENCOUNTER — TELEPHONE (OUTPATIENT)
Dept: INTERNAL MEDICINE | Facility: CLINIC | Age: 83
End: 2023-11-07

## 2023-11-07 NOTE — TELEPHONE ENCOUNTER
Caller: Argentina Rosales    Relationship: Emergency Contact    Best call back number: [phone]     What was the call regarding: PATIENT STATES THAT DR. RALPH TOLD HER TO HAVE HER DAUGHTER CALL TO GO OVER TRANSFER OF CARE. PLEASE ADVISE.

## 2023-11-10 ENCOUNTER — OFFICE VISIT (OUTPATIENT)
Dept: CARDIOLOGY | Facility: CLINIC | Age: 83
End: 2023-11-10
Payer: MEDICARE

## 2023-11-10 VITALS
HEART RATE: 80 BPM | OXYGEN SATURATION: 96 % | DIASTOLIC BLOOD PRESSURE: 62 MMHG | SYSTOLIC BLOOD PRESSURE: 116 MMHG | HEIGHT: 62 IN | WEIGHT: 217 LBS | BODY MASS INDEX: 39.93 KG/M2

## 2023-11-10 DIAGNOSIS — I27.20 PULMONARY HYPERTENSION: ICD-10-CM

## 2023-11-10 DIAGNOSIS — E66.01 CLASS 2 SEVERE OBESITY DUE TO EXCESS CALORIES WITH SERIOUS COMORBIDITY AND BODY MASS INDEX (BMI) OF 38.0 TO 38.9 IN ADULT: ICD-10-CM

## 2023-11-10 DIAGNOSIS — I10 ESSENTIAL HYPERTENSION: ICD-10-CM

## 2023-11-10 DIAGNOSIS — G47.33 OSA (OBSTRUCTIVE SLEEP APNEA): ICD-10-CM

## 2023-11-10 DIAGNOSIS — I50.20 HFREF (HEART FAILURE WITH REDUCED EJECTION FRACTION): ICD-10-CM

## 2023-11-10 DIAGNOSIS — I50.812 CHRONIC RIGHT-SIDED HEART FAILURE: ICD-10-CM

## 2023-11-10 DIAGNOSIS — I48.21 PERMANENT ATRIAL FIBRILLATION: ICD-10-CM

## 2023-11-10 DIAGNOSIS — E11.65 TYPE 2 DIABETES MELLITUS WITH HYPERGLYCEMIA, WITHOUT LONG-TERM CURRENT USE OF INSULIN: ICD-10-CM

## 2023-11-10 DIAGNOSIS — I50.32 CHRONIC HEART FAILURE WITH PRESERVED EJECTION FRACTION (HFPEF): Primary | ICD-10-CM

## 2023-11-10 RX ORDER — DILTIAZEM HYDROCHLORIDE 120 MG/1
120 CAPSULE, COATED, EXTENDED RELEASE ORAL DAILY
Qty: 90 CAPSULE | Refills: 3 | Status: SHIPPED | OUTPATIENT
Start: 2023-11-10

## 2023-11-10 RX ORDER — BUMETANIDE 2 MG/1
4 TABLET ORAL 2 TIMES DAILY
Qty: 360 TABLET | Refills: 3 | Status: SHIPPED | OUTPATIENT
Start: 2023-11-10

## 2023-11-10 RX ORDER — METOPROLOL SUCCINATE 100 MG/1
100 TABLET, EXTENDED RELEASE ORAL 2 TIMES DAILY
Qty: 180 TABLET | Refills: 3 | Status: SHIPPED | OUTPATIENT
Start: 2023-11-10

## 2023-11-10 NOTE — PROGRESS NOTES
Date of Office Visit: 11/10/2023  Encounter Provider: Luh Mejia MD  Place of Service: [facility] CARDIOLOGY  Patient Name: Linda Martel  :1940      Patient ID:  Linda Martel is a 83 y.o. female is here for  followup for CHF        History of Present Illness      She moved here from [place] in  and has a known history of persistent atrial fibrillation. She moved to this area because her daughter, [place], who is a friend of mine lives here and she wanted to be closer to her daughter and her grandchildren and for the help when she needs it. 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      She has a difficult to manage blood pressure. She has hyperlipidemia and maybe a history of Crohn's disease. She has difficulty getting around because of her back, but she walks and drives and does all of her activities of daily living.  She has DM type II, pulmonary hypertension, obstructive sleep apnea, obesity, history of GI bleeding when taking NSAIDs with anticoagulation      In 2017, she related that she had had some chest pressure and short-windedness.  We set her up for a stress nuclear perfusion study, which was done on 2016, and this showed a small- to medium-sized area of mild ischemia in the anterior wall.  Ejection fraction was normal at 62%.  She had an echocardiogram done as well, which showed ejection fraction of 55% with mild to moderate left atrial enlargement, mild to moderate mitral insufficiency, mildly reduced left ventricular systolic function, mild aortic insufficiency, and moderate pulmonary hypertension. When I spoke with her on the phone after the results, we decided that she was fairly low risk and she was not having any further chest pain or pressure and so we were going to treat things medically at that point.  She had a cardiac catheterization which was done  6/1/2017.  This showed normal coronary arteries with a right dominant coronary artery.  Left ventricle systolic function was normal.  Her LVEDP was also normal.      She had pulmonary function testing done with Dr. Graham and this abnormal so she went on to have high resolution CT scan of the chest.  This shows mosaic pattern in the lung tissue.     She presented to [facility] in March 2018 for acute respiratory failure, secondary pulmonary hypertension, and acute on chronic diastolic congestive heart failure.  Echocardiogram performed at that time showed ejection fraction of 57% with mild LVH, moderate OSVALDO and LAE and severely elevated RVSP.  She was treated with IV diuresis with a net loss of 9 L and she estimates 7-8 lbs. She ultimately underwent right heart cath that showed miexed pulmonary hypertension, PCWP 25-30mmHg      She had a right heart catheterization 6/2018 showing right atrial pressure 10 mmHg, right ventricular pressure 51/11 mmHg, pulmonary artery pressure 52/22 with a mean of 29 mmHg, and a wedge pressure 18 mm artery, cardiac index 2.3 L/m/m².  Her left ventricular end-diastolic pressure was 12 mmHg.  Her pulmonary pressures did not change with adenosine challenge.  This is consistent with moderate pulmonary hypertension.     She was admitted with worsening congestive heart failure and uncontrolled atrial fibrillation on 7/2/2020 with a dismissal on 7/11/2020.  She had increased her Lasix at home but did not have any relief with that.  When she arrived, she was diuresed with IV Lasix.  We were able to get her heart rate under better control.  Echo done 7/5/2020 showed ejection fraction 27% with global hypokinesis, severe right ventricular dilation was severely reduced right ventricular systolic function and RVSP greater than 55 mmHg.  Her right heart catheterization showed pulmonary wedge pressure 27 with a pulmonary artery pressure of 59/17, mean of 39, right ventricular pressure, 56/8  right atrial pressure of 12.  Her cardiac index is 1.51 L/min/m² with a cardiac output of 2.92 L/min calculated by Brittany.  There was no significant coronary artery disease.  She did have a repeat cardiac catheterization which showed nonischemic cardiomyopathy.  Her medication regimen was changed to also include Entresto.  She was released with hospice care at home.  On admission to the hospital, she was not using her CPAP for sleep apnea.     She is using her CPAP very faithfully.      Echo done 11/30/2021 ejection fraction 46-50% with mild global hypokinesis, mild aortic stenosis, no other significant abnormalities.      She was admitted for 24-hour observation 5/2023 with decompensation of her chronic HFpEF-she received IV diuretics.Labs showed at bedtime troponin 27, proBNP 1268, creatinine 1.51, glucose 103, otherwise normal CMP, normal CBC, INR 2.63.  Echocardiogram done 5/20/2023 showed ejection fraction of 46-50% with mildly reduced right ventricular systolic function, severe biatrial enlargement, mild aortic stenosis, otherwise no significant valvular abnormalities.  She did notice that before this decompensation, her blood pressure was running low, she was having fatigue.  She then began having more short windedness and hypoxia.     She overall is stable.  Her blood pressure is better since she is off of spironolactone, it has come up a little bit.  Her energy level is stable.  She has intermittent random chest pain.  She also sometimes wakes up in the morning and is short winded with some mild edema which seems to get better as the day goes along.  If she does not wear compression stockings, she does have edema.  She does not feel her heart racing or skipping and she is had no dizziness, syncope or falls.  She has no fevers, chills, nausea or vomiting.    Labs on 11/2/2023 showed creatinine 1.27, BUN 33, otherwise normal CMP, hemoglobin A1c 6.1%, total cholesterol 184, triglycerides 347, HDL 34, LDL 92.       Past Medical History:   Diagnosis Date    Atrial fibrillation     CHF (congestive heart failure)     Diabetes mellitus     Hyperlipidemia     Hypertension     IBS (irritable bowel syndrome)     Joint infection 2020    PROPHALITIC ANTIBX    JUANITA (obstructive sleep apnea)     Pulmonary hypertension          Past Surgical History:   Procedure Laterality Date    APPENDECTOMY      BACK SURGERY      CARDIAC CATHETERIZATION N/A 06/01/2017    Procedure: Coronary angiography;  Surgeon: Jacques Elliott MD;  Location:  [facility]AS[facility];  Service:     CARDIAC CATHETERIZATION N/A 06/06/2018    Procedure: Right Heart Cath with vasodilator challenge  Dr. elliott to perform;  Surgeon: Jacques Elliott MD;  Location:  [facility];  Service: Cardiovascular    CARDIAC CATHETERIZATION N/A 06/06/2018    Procedure: Left Heart Cath;  Surgeon: Jacques Elliott MD;  Location:  [facility];  Service: Cardiovascular    CARDIAC CATHETERIZATION N/A 07/08/2020    Procedure: Right and Left Heart Cath;  Surgeon: Saima Andrade MD;  Location:  [facility];  Service: Cardiovascular;  Laterality: N/A;  cors      CARDIAC CATHETERIZATION N/A 07/08/2020    Procedure: Coronary angiography;  Surgeon: Saima Andrade MD;  Location:  [facility];  Service: Cardiovascular;  Laterality: N/A;    COLONOSCOPY      EYE SURGERY Bilateral     CATARACTS    GALLBLADDER SURGERY      HIP SURGERY Left     KNEE SURGERY Bilateral     TONSILLECTOMY      TOTAL HIP ARTHROPLASTY Right 5/2/2022    Procedure: TOTAL HIP ARTHROPLASTY ANTERIOR WITH HANA TABLE;  Surgeon: Gio Mays II, MD;  Location: [facility] MAIN OR;  Service: Orthopedics;  Laterality: Right;    TOTAL KNEE ARTHROPLASTY Left 09/01/2019    Procedure: TOTAL KNEE ARTHROPLASTY, I&D with liner exchange;  Surgeon: Gio Mays II, MD;  Location: [facility] MAIN OR;  Service: Orthopedics       Current Outpatient  Medications on File Prior to Visit   Medication Sig Dispense Refill    Clotrimazole 1 % ointment Apply 1 application topically 2 (Two) Times a Day. 1 each 3    doxycycline (DORYX) 100 MG enteric coated tablet Take 1 tablet by mouth twice daily. 180 tablet 3    ferrous sulfate 325 (65 FE) MG tablet 1 tab PO daily Monday, Wednesday, Friday 90 tablet 2    HYDROcodone-acetaminophen (Norco) 5-325 MG per tablet Take 1 tablet by mouth Every 6 (Six) Hours As Needed for Mild Pain . 30 tablet 0    hydrocortisone 2.5 % cream Apply 1 application  topically to the appropriate area as directed 2 (Two) Times a Day. 15 g 1    Insulin Glargine (LANTUS SOLOSTAR) 100 UNIT/ML injection pen Inject 15 Units under the skin into the appropriate area as directed Every Night. 6 mL 6    Lancets (OneTouch Delica Plus [id number]) misc USE TO TEST BLOOD SUGAR 3 TO 4 TIMES DAILY 100 each 12    metFORMIN (GLUCOPHAGE) 500 MG tablet Take 1 tablet by mouth 2 (Two) Times a Day With Meals. 180 tablet 1    nitroglycerin (NITROSTAT) 0.4 MG SL tablet Place 1 tablet under the tongue Every 5 (Five) Minutes As Needed for Chest Pain. Take no more than 3 doses in 15 minutes. 30 tablet 1    nystatin (MYCOSTATIN) 644015 UNIT/GM powder Apply topically to the appropriate area three times daily as directed. 60 each 2    omeprazole (priLOSEC) 40 MG capsule Take 1 capsule by mouth As Needed (as needed for stomach upset). 90 capsule 2    Ondansetron HCl (ZOFRAN PO) Take 4 mg by mouth Every 4 (Four) Hours As Needed (as needed for nausea).      potassium chloride 10 MEQ CR tablet Take 1 tablet by mouth Every Night. 90 tablet 1    Probiotic Product (PROBIOTIC PO) Take 1 tablet by mouth 2 (Two) Times a Day.      prochlorperazine (COMPAZINE) 10 MG tablet TAKE ONE TABLET BY MOUTH EVERY 6 HOURS AS NEEDED FOR NAUSEA AND VOMITING 30 tablet 6    senna (SENOKOT) 8.6 MG tablet Take 2 tablets by mouth At Night As Needed (as needed for constipation). At bedtime      vitamin B-12  (CYANOCOBALAMIN) 1000 MCG tablet TAKE ONE TABLET BY MOUTH DAILY 90 tablet 2    vitamin E 400 UNIT capsule TAKE ONE CAPSULE BY MOUTH DAILY 90 capsule 1    warfarin (COUMADIN) 2.5 MG tablet Take 2 tablets by mouth two days per week and take 1 tablet by mouth all other days. (Patient taking differently: 5 mg 2 days   2.5 all other days) 90 tablet 0    [DISCONTINUED] bumetanide (BUMEX) 2 MG tablet Take 2 tablets by mouth 2 (Two) Times a Day. 360 tablet 1    [DISCONTINUED] dilTIAZem CD (CARDIZEM CD) 120 MG 24 hr capsule Take 1 capsule by mouth Daily. 90 capsule 1    [DISCONTINUED] empagliflozin (Jardiance) 10 MG tablet tablet Take 1 tablet by mouth Daily. 30 tablet 0    [DISCONTINUED] metoprolol succinate XL (TOPROL-XL) 100 MG 24 hr tablet Take 1 tablet by mouth 2 (Two) Times a Day. 180 tablet 2    [DISCONTINUED] sacubitril-valsartan (ENTRESTO) 49-51 MG tablet Take 1 tablet by mouth 2 (Two) Times a Day. 180 tablet 3    [DISCONTINUED] spironolactone (ALDACTONE) 25 MG tablet Take 0.5 tablets by mouth Daily. 45 tablet 3     No current facility-administered medications on file prior to visit.       Social History     Socioeconomic History    Marital status:    Tobacco Use    Smoking status: Never     Passive exposure: Never    Smokeless tobacco: Never    Tobacco comments:     caffeine use: 1 cup daily.    Vaping Use    Vaping Use: Never used   Substance and Sexual Activity    Alcohol use: Not Currently     Alcohol/week: 1.0 standard drink of alcohol     Types: 1 Glasses of wine per week     Comment: occasional    Drug use: No    Sexual activity: Defer           ROS    Procedures    ECG 12 Lead    Date/Time: 11/10/2023 3:41 PM  Performed by: Luh Mejia MD    Authorized by: Luh Mejia MD  Comparison: compared with previous ECG   Similar to previous ECG  Rhythm: atrial fibrillation  Conduction: non-specific intraventricular conduction delay  Other findings: low voltage and poor R wave  "progression    Clinical impression: abnormal EKG              Objective:      Vitals:    11/10/23 1445   BP: 116/62   BP Location: Left arm   Patient Position: Sitting   Cuff Size: Adult   Pulse: 80   SpO2: 96%   Weight: 98.4 kg (217 lb)   Height: 157.5 cm (62\")     Body mass index is 39.69 kg/m².    Vitals reviewed.   Constitutional:       General: Not in acute distress.     Appearance: Well-developed. Not diaphoretic.   Eyes:      General: No scleral icterus.     Conjunctiva/sclera: Conjunctivae normal.   HENT:      Head: Normocephalic and atraumatic.   Neck:      Thyroid: No thyromegaly.      Vascular: No carotid bruit or JVD.      Lymphadenopathy: No cervical adenopathy.   Pulmonary:      Effort: Pulmonary effort is normal. No respiratory distress.      Breath sounds: Normal breath sounds. No wheezing. No rhonchi. No rales.   Chest:      Chest wall: Not tender to palpatation.   Cardiovascular:      Normal rate. Irregularly irregular rhythm.      Murmurs: There is a grade 2/6 systolic murmur at the URSB and ULSB.      No gallop.  No rub.   Pulses:     Intact distal pulses.      Carotid: 2+ bilaterally.     Radial: 2+ bilaterally.     Dorsalis pedis: 2+ bilaterally.     Posterior tibial: 2+ bilaterally.  Edema:     Peripheral edema present.     Ankle: bilateral 1+ edema of the ankle.  Abdominal:      General: Bowel sounds are normal. There is no distension or abdominal bruit.      Palpations: Abdomen is soft. There is no abdominal mass.      Tenderness: There is no abdominal tenderness.   Musculoskeletal:         General: No deformity.      Extremities: No clubbing present.     Cervical back: Neck supple. Skin:     General: Skin is warm and dry. There is no cyanosis.      Coloration: Skin is not pale.      Findings: No rash.   Neurological:      Mental Status: Alert and oriented to person, place, and time.      Cranial Nerves: No cranial nerve deficit.   Psychiatric:         Judgment: Judgment normal.         Lab " Review:       Assessment:      Diagnosis Plan   1. Chronic heart failure with preserved ejection fraction (HFpEF)        2. Essential hypertension  sacubitril-valsartan (ENTRESTO) 49-51 MG tablet    metoprolol succinate XL (TOPROL-XL) 100 MG 24 hr tablet      3. Pulmonary hypertension  sacubitril-valsartan (ENTRESTO) 49-51 MG tablet      4. Permanent atrial fibrillation  metoprolol succinate XL (TOPROL-XL) 100 MG 24 hr tablet    dilTIAZem CD (CARDIZEM CD) 120 MG 24 hr capsule      5. Type 2 diabetes mellitus with hyperglycemia, without long-term current use of insulin        6. Class 2 severe obesity due to excess calories with serious comorbidity and body mass index (BMI) of 38.0 to 38.9 in adult        7. Chronic right-sided heart failure  ECG 12 Lead      8. JUANITA (obstructive sleep apnea)              Chronic HFpEF, mildly volume overloaded today  Chronic right heart failure  Permanent atrial fibrillation, on warfarin and rate control.  Her CHADS2 Vascor 6 giving a 9.8% annual risk of stroke.  INR target is 2-3.  Hypertension, well controlled  JUANITA on CPAP, she is compliant with this  Diabetes mellitus type 2, requiring insulin  Hyperlipidemia, on rosuvastatin  Sedentary with mobility issues, has rods in her back, uses a walker.  Obesity  Mixed etiology pulmonary hypertension  History of nonischemic cardiomyopathy resolved  Left knee infection-on chronic antibiotics  CKD  Osteoarthritis    Plan:         See Cely in 3 months, see me in 6 months.  I spoke with her daughter [place] on the phone.  She will need a continuance of care letter likely.  Take extra 2 mg Bumex tablet when she is short winded or having edema in addition to her 4 mg twice daily.  Advised that she wear her compression stockings as it helps her lower extremity edema.

## 2023-11-28 LAB — INR PPP: 3.8 (ref 2–3)

## 2023-11-29 ENCOUNTER — ANTICOAGULATION VISIT (OUTPATIENT)
Dept: INTERNAL MEDICINE | Facility: CLINIC | Age: 83
End: 2023-11-29
Payer: MEDICARE

## 2023-12-06 ENCOUNTER — OFFICE VISIT (OUTPATIENT)
Dept: INTERNAL MEDICINE | Facility: CLINIC | Age: 83
End: 2023-12-06
Payer: MEDICARE

## 2023-12-06 VITALS
HEIGHT: 62 IN | WEIGHT: 219.8 LBS | RESPIRATION RATE: 20 BRPM | TEMPERATURE: 98.4 F | DIASTOLIC BLOOD PRESSURE: 76 MMHG | SYSTOLIC BLOOD PRESSURE: 120 MMHG | BODY MASS INDEX: 40.45 KG/M2 | OXYGEN SATURATION: 97 % | HEART RATE: 75 BPM

## 2023-12-06 DIAGNOSIS — L30.4 INTERTRIGO: ICD-10-CM

## 2023-12-06 DIAGNOSIS — I50.20 HFREF (HEART FAILURE WITH REDUCED EJECTION FRACTION): ICD-10-CM

## 2023-12-06 DIAGNOSIS — I10 ESSENTIAL HYPERTENSION: Primary | ICD-10-CM

## 2023-12-06 DIAGNOSIS — E11.65 TYPE 2 DIABETES MELLITUS WITH HYPERGLYCEMIA, WITHOUT LONG-TERM CURRENT USE OF INSULIN: ICD-10-CM

## 2023-12-06 DIAGNOSIS — E78.2 MIXED HYPERLIPIDEMIA: ICD-10-CM

## 2023-12-06 RX ORDER — NYSTATIN AND TRIAMCINOLONE ACETONIDE 100000; 1 [USP'U]/G; MG/G
1 OINTMENT TOPICAL 2 TIMES DAILY
Qty: 60 G | Refills: 5 | Status: SHIPPED | OUTPATIENT
Start: 2023-12-06

## 2023-12-07 NOTE — PROGRESS NOTES
"Chief Complaint  Establish Care (Previous Dr. Graham patient)    Subjective        Linda Martel presents to [facility]  History of Present Illness  Here as transition of care from Dr. Graham  No acute concerns today  Overall doing well at her baseline  Noted T2DM with A1C 6.1 at goal - she is on lantus, metformin, jardiance (coexistent HFpEF)   Afib on warfarin for AC and diltiazem/metoprolol for BP/rate control  She is on doxycycline chronically for L knee infection  HLD on crestor     Objective   Vital Signs:  /76   Pulse 75   Temp 98.4 °F (36.9 °C)   Resp 20   Ht 157.5 cm (62\")   Wt 99.7 kg (219 lb 12.8 oz)   SpO2 97%   BMI 40.20 kg/m²   Estimated body mass index is 40.2 kg/m² as calculated from the following:    Height as of this encounter: 157.5 cm (62\").    Weight as of this encounter: 99.7 kg (219 lb 12.8 oz).     Physical Exam  Vitals and nursing note reviewed.   Constitutional:       General: She is not in acute distress.     Appearance: Normal appearance.   HENT:      Head: Normocephalic and atraumatic.      Right Ear: External ear normal.      Left Ear: External ear normal.      Nose: Nose normal. No congestion.      Mouth/Throat:      Mouth: Mucous membranes are moist.      Pharynx: No oropharyngeal exudate or posterior oropharyngeal erythema.   Eyes:      Extraocular Movements: Extraocular movements intact.      Conjunctiva/sclera: Conjunctivae normal.      Pupils: Pupils are equal, round, and reactive to light.   Cardiovascular:      Rate and Rhythm: Normal rate. Rhythm irregular.      Pulses: Normal pulses.      Heart sounds: Normal heart sounds. No murmur heard.  Pulmonary:      Effort: Pulmonary effort is normal. No respiratory distress.      Breath sounds: Normal breath sounds. No wheezing or rales.   Musculoskeletal:      Cervical back: Normal range of motion.      Right lower leg: Edema present.      Left lower leg: Edema present. "      Comments: Bilateral LE edema, with compression stockings on today   Skin:     General: Skin is warm.      Capillary Refill: Capillary refill takes less than 2 seconds.   Neurological:      General: No focal deficit present.      Mental Status: She is alert and oriented to person, place, and time. Mental status is at baseline.      Cranial Nerves: No cranial nerve deficit.   Psychiatric:         Mood and Affect: Mood normal.         Behavior: Behavior normal.         Thought Content: Thought content normal.        Result Review :  The following data was reviewed by: Alejandro Hall MD on 12/06/2023:  Common labs          5/30/2023    13:27 9/5/2023    12:06 11/2/2023    15:15   Common Labs   Glucose  118  134    BUN  32  33    Creatinine  1.35  1.27    Sodium  138  143    Potassium  4.7  4.6    Chloride  97  102    Calcium  9.6  9.8    Total Protein   6.3    Albumin   4.4    Total Bilirubin   0.8    Alkaline Phosphatase   94    AST (SGOT)   14    ALT (SGPT)   11    Total Cholesterol 123   184    Triglycerides 222   347    HDL Cholesterol 36   34    LDL Cholesterol  51   92    Hemoglobin A1C 6.1   6.10      Data reviewed : prior office notes reviewed             Assessment and Plan   Diagnoses and all orders for this visit:    1. Essential hypertension (Primary)  Controlled on current regimen    2. Mixed hyperlipidemia  - on crestor, controlled, LDL 92 last check     3. HFrEF (heart failure with reduced ejection fraction)  - on GDMT, follows with cardiology    4. Intertrigo  -     Clotrimazole 1 % ointment; Apply 1 application  topically 2 (Two) Times a Day.  Dispense: 56.7 each; Refill: 3  -     nystatin-triamcinolone (MYCOLOG) 388564-7.1 UNIT/GM-% ointment; Apply 1 application  topically to the appropriate area as directed 2 (Two) Times a Day.  Dispense: 60 g; Refill: 5    5. T2DM  on insulin, metformin, jardiance, A1C at goal          I spent 20 minutes caring for Linda on this date of service. This time includes  time spent by me in the following activities:preparing for the visit, reviewing tests, obtaining and/or reviewing a separately obtained history, performing a medically appropriate examination and/or evaluation , counseling and educating the patient/family/caregiver, ordering medications, tests, or procedures, and documenting information in the medical record  Follow Up   Return in about 3 months (around 3/6/2024) for Recheck.  Patient was given instructions and counseling regarding her condition or for health maintenance advice. Please see specific information pulled into the AVS if appropriate.     Alejandro Hall MD  [facility]  [address] [place] [address]  Phone: [phone]

## 2023-12-08 ENCOUNTER — TELEPHONE (OUTPATIENT)
Dept: CARDIOLOGY | Facility: CLINIC | Age: 83
End: 2023-12-08
Payer: MEDICARE

## 2023-12-08 DIAGNOSIS — I50.20 HFREF (HEART FAILURE WITH REDUCED EJECTION FRACTION): ICD-10-CM

## 2023-12-08 DIAGNOSIS — E11.65 TYPE 2 DIABETES MELLITUS WITH HYPERGLYCEMIA, WITHOUT LONG-TERM CURRENT USE OF INSULIN: ICD-10-CM

## 2023-12-08 NOTE — TELEPHONE ENCOUNTER
Called and spoke with MARIBELL ramirez and they stated that the patient  was incorrect and they were not able to talk to me.  I verified the correct  and she was able to verify that the patient needs a new RX sent in for the patient to get her meds.       has been updated and MARIBELL ramirez needs a new RX printed for the Jardiance 10 mg and have RM sign it and fax to [phone].      Thanks so much!  [place]

## 2023-12-08 NOTE — TELEPHONE ENCOUNTER
----- Message from Sirisha Gordillo Rep sent at 2023  8:39 AM EST -----  Regarding: [employer] Patient Assistance  I received a fax from [employer] stating that patient's application is missing patient identifiers.    I looked at the fax that was sent by Dr. Mejia. Patient's  with [employer] is listed as 1940.  Prescription sent over shows 1940.   [facility] has always listed patients  as 1940.    Looks like a new script just needs to be faxed to them with the  1940.    Thanks,  Latisha WHEELER [facility]

## 2023-12-11 DIAGNOSIS — E11.65 TYPE 2 DIABETES MELLITUS WITH HYPERGLYCEMIA, WITHOUT LONG-TERM CURRENT USE OF INSULIN: ICD-10-CM

## 2023-12-11 DIAGNOSIS — I50.20 HFREF (HEART FAILURE WITH REDUCED EJECTION FRACTION): ICD-10-CM

## 2023-12-21 LAB — INR PPP: 2.8 (ref 2–3)

## 2024-01-05 ENCOUNTER — ANTICOAGULATION VISIT (OUTPATIENT)
Dept: INTERNAL MEDICINE | Facility: CLINIC | Age: 84
End: 2024-01-05
Payer: MEDICARE

## 2024-01-18 ENCOUNTER — OFFICE VISIT (OUTPATIENT)
Dept: INTERNAL MEDICINE | Facility: CLINIC | Age: 84
End: 2024-01-18
Payer: MEDICARE

## 2024-01-18 VITALS
WEIGHT: 216 LBS | SYSTOLIC BLOOD PRESSURE: 120 MMHG | OXYGEN SATURATION: 98 % | RESPIRATION RATE: 16 BRPM | HEART RATE: 107 BPM | TEMPERATURE: 98.2 F | HEIGHT: 62 IN | DIASTOLIC BLOOD PRESSURE: 68 MMHG | BODY MASS INDEX: 39.75 KG/M2

## 2024-01-18 DIAGNOSIS — L30.4 INTERTRIGO: ICD-10-CM

## 2024-01-18 DIAGNOSIS — I10 ESSENTIAL HYPERTENSION: ICD-10-CM

## 2024-01-18 DIAGNOSIS — E11.65 TYPE 2 DIABETES MELLITUS WITH HYPERGLYCEMIA, WITHOUT LONG-TERM CURRENT USE OF INSULIN: ICD-10-CM

## 2024-01-18 DIAGNOSIS — G62.9 NEUROPATHY: Primary | ICD-10-CM

## 2024-01-18 DIAGNOSIS — I27.20 PULMONARY HYPERTENSION: ICD-10-CM

## 2024-01-18 DIAGNOSIS — I48.21 PERMANENT ATRIAL FIBRILLATION: ICD-10-CM

## 2024-01-18 DIAGNOSIS — I50.20 HFREF (HEART FAILURE WITH REDUCED EJECTION FRACTION): ICD-10-CM

## 2024-01-18 PROCEDURE — 3078F DIAST BP <80 MM HG: CPT | Performed by: INTERNAL MEDICINE

## 2024-01-18 PROCEDURE — 99214 OFFICE O/P EST MOD 30 MIN: CPT | Performed by: INTERNAL MEDICINE

## 2024-01-18 PROCEDURE — 3074F SYST BP LT 130 MM HG: CPT | Performed by: INTERNAL MEDICINE

## 2024-01-18 RX ORDER — METOPROLOL SUCCINATE 100 MG/1
100 TABLET, EXTENDED RELEASE ORAL 2 TIMES DAILY
Qty: 180 TABLET | Refills: 3 | Status: SHIPPED | OUTPATIENT
Start: 2024-01-18

## 2024-01-18 RX ORDER — WARFARIN SODIUM 2.5 MG/1
TABLET ORAL
Qty: 90 TABLET | Refills: 3 | Status: SHIPPED | OUTPATIENT
Start: 2024-01-18

## 2024-01-18 RX ORDER — NYSTATIN AND TRIAMCINOLONE ACETONIDE 100000; 1 [USP'U]/G; MG/G
1 OINTMENT TOPICAL 2 TIMES DAILY
Qty: 180 G | Refills: 3 | Status: SHIPPED | OUTPATIENT
Start: 2024-01-18

## 2024-01-18 RX ORDER — BLOOD SUGAR DIAGNOSTIC
STRIP MISCELLANEOUS
Qty: 100 EACH | Refills: 12 | Status: SHIPPED | OUTPATIENT
Start: 2024-01-18

## 2024-01-18 RX ORDER — NYSTATIN 100000 [USP'U]/G
POWDER TOPICAL SEE ADMIN INSTRUCTIONS
Qty: 180 G | Refills: 3 | Status: SHIPPED | OUTPATIENT
Start: 2024-01-18

## 2024-01-18 RX ORDER — BUMETANIDE 2 MG/1
4 TABLET ORAL 2 TIMES DAILY
Qty: 360 TABLET | Refills: 3 | Status: SHIPPED | OUTPATIENT
Start: 2024-01-18

## 2024-01-18 RX ORDER — DILTIAZEM HYDROCHLORIDE 120 MG/1
120 CAPSULE, COATED, EXTENDED RELEASE ORAL DAILY
Qty: 90 CAPSULE | Refills: 3 | Status: SHIPPED | OUTPATIENT
Start: 2024-01-18

## 2024-01-18 RX ORDER — DOXYCYCLINE HYCLATE 100 MG/1
100 CAPSULE ORAL 2 TIMES DAILY
Qty: 180 CAPSULE | Refills: 3 | Status: SHIPPED | OUTPATIENT
Start: 2024-01-18

## 2024-01-18 RX ORDER — POTASSIUM CHLORIDE 750 MG/1
10 TABLET, FILM COATED, EXTENDED RELEASE ORAL NIGHTLY
Qty: 90 TABLET | Refills: 3 | Status: SHIPPED | OUTPATIENT
Start: 2024-01-18

## 2024-01-19 ENCOUNTER — TELEPHONE (OUTPATIENT)
Dept: INTERNAL MEDICINE | Facility: CLINIC | Age: 84
End: 2024-01-19
Payer: MEDICARE

## 2024-01-19 DIAGNOSIS — G62.9 NEUROPATHY: ICD-10-CM

## 2024-01-19 NOTE — TELEPHONE ENCOUNTER
The Gabapentin cream was sent to [facility] pharmacy they called to say it needs to go to a compounding pharmacy which they are not.  Please advise.    Med sent to [facility] pharmacy by Dr Hall.    Pt aware

## 2024-01-22 NOTE — PROGRESS NOTES
"Chief Complaint  Establish Care (Medication questions) and Foot Pain (Both feet hurt)    Subjective        Linda Martel presents to [facility]  History of Present Illness  Here for follow up  She needs all her meds sent to new pharmacy due to issues with [facility]  Now starting to pins and needles pain in toes at the tips  Worse at night and causing issues with sleep    Objective   Vital Signs:  /68   Pulse 107   Temp 98.2 °F (36.8 °C)   Resp 16   Ht 157.5 cm (62\")   Wt 98 kg (216 lb)   SpO2 98%   BMI 39.51 kg/m²   Estimated body mass index is 39.51 kg/m² as calculated from the following:    Height as of this encounter: 157.5 cm (62\").    Weight as of this encounter: 98 kg (216 lb).       Physical Exam  Vitals and nursing note reviewed.   Constitutional:       General: She is not in acute distress.     Appearance: Normal appearance.   HENT:      Head: Normocephalic and atraumatic.      Right Ear: External ear normal.      Left Ear: External ear normal.      Nose: Nose normal. No congestion.      Mouth/Throat:      Mouth: Mucous membranes are moist.      Pharynx: No oropharyngeal exudate or posterior oropharyngeal erythema.   Eyes:      Extraocular Movements: Extraocular movements intact.      Conjunctiva/sclera: Conjunctivae normal.      Pupils: Pupils are equal, round, and reactive to light.   Cardiovascular:      Rate and Rhythm: Normal rate and regular rhythm.      Pulses: Normal pulses.      Heart sounds: Normal heart sounds. No murmur heard.  Pulmonary:      Effort: Pulmonary effort is normal. No respiratory distress.      Breath sounds: Normal breath sounds. No wheezing or rales.   Musculoskeletal:      Cervical back: Normal range of motion.   Skin:     General: Skin is warm.      Capillary Refill: Capillary refill takes less than 2 seconds.   Neurological:      General: No focal deficit present.      Mental Status: She is alert and " oriented to person, place, and time. Mental status is at baseline.      Cranial Nerves: No cranial nerve deficit.   Psychiatric:         Mood and Affect: Mood normal.         Behavior: Behavior normal.         Thought Content: Thought content normal.        Result Review :    The following data was reviewed by: Alejandro Hall MD on 01/18/2024:  Common labs          5/30/2023    13:27 9/5/2023    12:06 11/2/2023    15:15   Common Labs   Glucose  118  134    BUN  32  33    Creatinine  1.35  1.27    Sodium  138  143    Potassium  4.7  4.6    Chloride  97  102    Calcium  9.6  9.8    Total Protein   6.3    Albumin   4.4    Total Bilirubin   0.8    Alkaline Phosphatase   94    AST (SGOT)   14    ALT (SGPT)   11    Total Cholesterol 123   184    Triglycerides 222   347    HDL Cholesterol 36   34    LDL Cholesterol  51   92    Hemoglobin A1C 6.1   6.10      Data reviewed : prior office notes reviewed             Assessment and Plan     Diagnoses and all orders for this visit:    1. Neuropathy (Primary)  -     Gabapentin 10 % cream; Apply 1 Application topically Every Night.  Dispense: 30 g; Refill: 5    2. Permanent atrial fibrillation  -     dilTIAZem CD (CARDIZEM CD) 120 MG 24 hr capsule; Take 1 capsule by mouth Daily.  Dispense: 90 capsule; Refill: 3  -     metoprolol succinate XL (TOPROL-XL) 100 MG 24 hr tablet; Take 1 tablet by mouth 2 (Two) Times a Day.  Dispense: 180 tablet; Refill: 3  -     warfarin (COUMADIN) 2.5 MG tablet; 5 mg 2 days , 2.5 all other days  Dispense: 90 tablet; Refill: 3    3. Type 2 diabetes mellitus with hyperglycemia, without long-term current use of insulin  -     metFORMIN (GLUCOPHAGE) 500 MG tablet; Take 1 tablet by mouth 2 (Two) Times a Day With Meals.  Dispense: 180 tablet; Refill: 3  -     glucose blood (Accu-Chek Guide) test strip; Use as instructed to check blood glucose once a day.  Dispense: 100 each; Refill: 12    4. HFrEF (heart failure with reduced ejection fraction)  -     bumetanide  (BUMEX) 2 MG tablet; Take 2 tablets by mouth 2 (Two) Times a Day.  Dispense: 360 tablet; Refill: 3  -     potassium chloride 10 MEQ CR tablet; Take 1 tablet by mouth Every Night.  Dispense: 90 tablet; Refill: 3    5. Essential hypertension  -     metoprolol succinate XL (TOPROL-XL) 100 MG 24 hr tablet; Take 1 tablet by mouth 2 (Two) Times a Day.  Dispense: 180 tablet; Refill: 3  -     sacubitril-valsartan (ENTRESTO) 49-51 MG tablet; Take 1 tablet by mouth 2 (Two) Times a Day.  Dispense: 180 tablet; Refill: 3    6. Pulmonary hypertension  -     sacubitril-valsartan (ENTRESTO) 49-51 MG tablet; Take 1 tablet by mouth 2 (Two) Times a Day.  Dispense: 180 tablet; Refill: 3    7. Intertrigo  -     nystatin (MYCOSTATIN) 342725 UNIT/GM powder; Apply  topically to the appropriate area as directed See Admin Instructions. Apply topically to the affected area three times daily as directed  Dispense: 180 g; Refill: 3  -     nystatin-triamcinolone (MYCOLOG) 782758-5.1 UNIT/GM-% ointment; Apply 1 Application topically to the appropriate area as directed 2 (Two) Times a Day.  Dispense: 180 g; Refill: 3  -     hydrocortisone 2.5 % cream; Apply 1 application  topically to the appropriate area as directed 2 (Two) Times a Day.  Dispense: 90 g; Refill: 3    Other orders  -     doxycycline (VIBRAMYCIN) 100 MG capsule; Take 1 capsule by mouth 2 (Two) Times a Day.  Dispense: 180 capsule; Refill: 3      Trial topical gabapentin compounded for presumed bilateral peripheral neuropathy. We also discussed oral option pending price and efficacy. Will see back next month for regular follow up with labs.      I spent 20 minutes caring for Linda on this date of service. This time includes time spent by me in the following activities:preparing for the visit, reviewing tests, obtaining and/or reviewing a separately obtained history, performing a medically appropriate examination and/or evaluation , counseling and educating the  patient/family/caregiver, ordering medications, tests, or procedures, and documenting information in the medical record  Follow Up     Return for Next scheduled follow up.  Patient was given instructions and counseling regarding her condition or for health maintenance advice. Please see specific information pulled into the AVS if appropriate.     Alejandro Hall MD  [facility]  [facility]  Phone: [phone]

## 2024-01-30 LAB — INR PPP: 2.9 (ref 2–3)

## 2024-02-02 DIAGNOSIS — I48.21 PERMANENT ATRIAL FIBRILLATION: ICD-10-CM

## 2024-02-02 NOTE — TELEPHONE ENCOUNTER
Caller: Linda Martel AMY    Relationship: Self    Best call back number: [phone]     Requested Prescriptions:   Requested Prescriptions     Pending Prescriptions Disp Refills    warfarin (COUMADIN) 2.5 MG tablet 90 tablet 3     Si mg 2 days , 2.5 all other days        Pharmacy where request should be sent: [facility] [id number] [facility] [id number] [facility] [phone] [facility] [phone] FX     Last office visit with prescribing clinician: 2024   Last telemedicine visit with prescribing clinician: Visit date not found   Next office visit with prescribing clinician: 2024     Additional details provided by patient: PHARMACY IS NEEDING A  NEW WRITTEN PRESCRIPTION SENT IN AS THEY HAVE NOTHING ON FILE FOR PATIENT WITH THIS MEDICATION.     PATIENT IS DOWN TO HER LAST WEEK OF MEDICATION.     Does the patient have less than a 3 day supply:  [] Yes  [x] No    Would you like a call back once the refill request has been completed: [] Yes [x] No    If the office needs to give you a call back, can they leave a voicemail: [x] Yes [] No    Sirisha Baires Rep   24 11:11 EST

## 2024-02-02 NOTE — TELEPHONE ENCOUNTER
I called in the patient's prescription for Warfarin on [facility] .  They did not receive it on 1/18/24.    Thank you

## 2024-02-03 RX ORDER — WARFARIN SODIUM 2.5 MG/1
TABLET ORAL
Qty: 90 TABLET | Refills: 3 | Status: SHIPPED | OUTPATIENT
Start: 2024-02-03

## 2024-02-14 ENCOUNTER — OFFICE VISIT (OUTPATIENT)
Dept: CARDIOLOGY | Facility: CLINIC | Age: 84
End: 2024-02-14
Payer: MEDICARE

## 2024-02-14 VITALS
DIASTOLIC BLOOD PRESSURE: 70 MMHG | HEART RATE: 74 BPM | BODY MASS INDEX: 39.51 KG/M2 | OXYGEN SATURATION: 96 % | SYSTOLIC BLOOD PRESSURE: 115 MMHG | HEIGHT: 62 IN

## 2024-02-14 DIAGNOSIS — E11.65 TYPE 2 DIABETES MELLITUS WITH HYPERGLYCEMIA, WITHOUT LONG-TERM CURRENT USE OF INSULIN: ICD-10-CM

## 2024-02-14 DIAGNOSIS — E78.2 MIXED HYPERLIPIDEMIA: ICD-10-CM

## 2024-02-14 DIAGNOSIS — E66.01 CLASS 2 SEVERE OBESITY DUE TO EXCESS CALORIES WITH SERIOUS COMORBIDITY AND BODY MASS INDEX (BMI) OF 38.0 TO 38.9 IN ADULT: ICD-10-CM

## 2024-02-14 DIAGNOSIS — I50.20 HFREF (HEART FAILURE WITH REDUCED EJECTION FRACTION): Primary | ICD-10-CM

## 2024-02-14 DIAGNOSIS — G47.33 OSA (OBSTRUCTIVE SLEEP APNEA): ICD-10-CM

## 2024-02-14 DIAGNOSIS — I10 ESSENTIAL HYPERTENSION: ICD-10-CM

## 2024-02-14 DIAGNOSIS — I48.21 PERMANENT ATRIAL FIBRILLATION: ICD-10-CM

## 2024-02-14 DIAGNOSIS — I27.20 PULMONARY HYPERTENSION: ICD-10-CM

## 2024-02-16 ENCOUNTER — ANTICOAGULATION VISIT (OUTPATIENT)
Dept: INTERNAL MEDICINE | Facility: CLINIC | Age: 84
End: 2024-02-16
Payer: MEDICARE

## 2024-03-12 ENCOUNTER — TELEPHONE (OUTPATIENT)
Dept: INTERNAL MEDICINE | Facility: CLINIC | Age: 84
End: 2024-03-12
Payer: MEDICARE

## 2024-03-28 LAB — INR PPP: 2.2 (ref 2–3)

## 2024-03-29 ENCOUNTER — ANTICOAGULATION VISIT (OUTPATIENT)
Dept: INTERNAL MEDICINE | Facility: CLINIC | Age: 84
End: 2024-03-29
Payer: MEDICARE

## 2024-04-09 LAB — INR PPP: 2 (ref 2–3)

## 2024-04-11 ENCOUNTER — ANTICOAGULATION VISIT (OUTPATIENT)
Dept: INTERNAL MEDICINE | Facility: CLINIC | Age: 84
End: 2024-04-11
Payer: MEDICARE

## 2024-04-29 ENCOUNTER — ANTICOAGULATION VISIT (OUTPATIENT)
Dept: INTERNAL MEDICINE | Facility: CLINIC | Age: 84
End: 2024-04-29
Payer: MEDICARE

## 2024-04-29 LAB — INR PPP: 1.9 (ref 2–3)

## 2024-05-01 ENCOUNTER — OFFICE VISIT (OUTPATIENT)
Dept: INTERNAL MEDICINE | Facility: CLINIC | Age: 84
End: 2024-05-01
Payer: MEDICARE

## 2024-05-01 VITALS
BODY MASS INDEX: 39.82 KG/M2 | WEIGHT: 216.4 LBS | HEART RATE: 104 BPM | HEIGHT: 62 IN | OXYGEN SATURATION: 94 % | SYSTOLIC BLOOD PRESSURE: 124 MMHG | DIASTOLIC BLOOD PRESSURE: 72 MMHG | RESPIRATION RATE: 16 BRPM

## 2024-05-01 DIAGNOSIS — L30.4 INTERTRIGO: ICD-10-CM

## 2024-05-01 DIAGNOSIS — G62.9 NEUROPATHY: ICD-10-CM

## 2024-05-01 DIAGNOSIS — E78.2 MIXED HYPERLIPIDEMIA: ICD-10-CM

## 2024-05-01 DIAGNOSIS — Z13.820 OSTEOPOROSIS SCREENING: ICD-10-CM

## 2024-05-01 DIAGNOSIS — N95.8 OTHER SPECIFIED MENOPAUSAL AND PERIMENOPAUSAL DISORDERS: ICD-10-CM

## 2024-05-01 DIAGNOSIS — I10 ESSENTIAL HYPERTENSION: ICD-10-CM

## 2024-05-01 DIAGNOSIS — E11.65 TYPE 2 DIABETES MELLITUS WITH HYPERGLYCEMIA, WITHOUT LONG-TERM CURRENT USE OF INSULIN: Primary | ICD-10-CM

## 2024-05-01 DIAGNOSIS — I48.21 PERMANENT ATRIAL FIBRILLATION: ICD-10-CM

## 2024-05-01 PROCEDURE — 99214 OFFICE O/P EST MOD 30 MIN: CPT | Performed by: INTERNAL MEDICINE

## 2024-05-01 PROCEDURE — 3078F DIAST BP <80 MM HG: CPT | Performed by: INTERNAL MEDICINE

## 2024-05-01 PROCEDURE — 1160F RVW MEDS BY RX/DR IN RCRD: CPT | Performed by: INTERNAL MEDICINE

## 2024-05-01 PROCEDURE — 1126F AMNT PAIN NOTED NONE PRSNT: CPT | Performed by: INTERNAL MEDICINE

## 2024-05-01 PROCEDURE — 1159F MED LIST DOCD IN RCRD: CPT | Performed by: INTERNAL MEDICINE

## 2024-05-01 PROCEDURE — 3074F SYST BP LT 130 MM HG: CPT | Performed by: INTERNAL MEDICINE

## 2024-05-01 RX ORDER — FLUCONAZOLE 150 MG/1
150 TABLET ORAL WEEKLY
Qty: 4 TABLET | Refills: 0 | Status: SHIPPED | OUTPATIENT
Start: 2024-05-01

## 2024-05-02 LAB
ALBUMIN SERPL-MCNC: 4.3 G/DL (ref 3.7–4.7)
ALBUMIN/GLOB SERPL: 2 {RATIO} (ref 1.2–2.2)
ALP SERPL-CCNC: 117 IU/L (ref 44–121)
ALT SERPL-CCNC: 10 IU/L (ref 0–32)
AST SERPL-CCNC: 13 IU/L (ref 0–40)
BASOPHILS # BLD AUTO: 0.1 X10E3/UL (ref 0–0.2)
BASOPHILS NFR BLD AUTO: 1 %
BILIRUB SERPL-MCNC: 1 MG/DL (ref 0–1.2)
BUN SERPL-MCNC: 32 MG/DL (ref 8–27)
BUN/CREAT SERPL: 24 (ref 12–28)
CALCIUM SERPL-MCNC: 9.4 MG/DL (ref 8.7–10.3)
CHLORIDE SERPL-SCNC: 98 MMOL/L (ref 96–106)
CHOLEST SERPL-MCNC: 197 MG/DL (ref 100–199)
CO2 SERPL-SCNC: 24 MMOL/L (ref 20–29)
CREAT SERPL-MCNC: 1.33 MG/DL (ref 0.57–1)
CREAT UR-MCNC: NORMAL MG/DL
EGFRCR SERPLBLD CKD-EPI 2021: 40 ML/MIN/1.73
EOSINOPHIL # BLD AUTO: 0.2 X10E3/UL (ref 0–0.4)
EOSINOPHIL NFR BLD AUTO: 3 %
ERYTHROCYTE [DISTWIDTH] IN BLOOD BY AUTOMATED COUNT: 13.1 % (ref 11.7–15.4)
GLOBULIN SER CALC-MCNC: 2.2 G/DL (ref 1.5–4.5)
GLUCOSE SERPL-MCNC: 106 MG/DL (ref 70–99)
HBA1C MFR BLD: 6.2 % (ref 4.8–5.6)
HCT VFR BLD AUTO: 45.1 % (ref 34–46.6)
HDLC SERPL-MCNC: 38 MG/DL
HGB BLD-MCNC: 14.4 G/DL (ref 11.1–15.9)
IMM GRANULOCYTES # BLD AUTO: 0 X10E3/UL (ref 0–0.1)
IMM GRANULOCYTES NFR BLD AUTO: 0 %
INR PPP: 2.2 (ref 0.9–1.2)
INR PPP: 2.2 (ref 2–3)
LDLC SERPL CALC-MCNC: 109 MG/DL (ref 0–99)
LYMPHOCYTES # BLD AUTO: 0.7 X10E3/UL (ref 0.7–3.1)
LYMPHOCYTES NFR BLD AUTO: 11 %
MCH RBC QN AUTO: 29.2 PG (ref 26.6–33)
MCHC RBC AUTO-ENTMCNC: 31.9 G/DL (ref 31.5–35.7)
MCV RBC AUTO: 92 FL (ref 79–97)
MICROALBUMIN UR-MCNC: NORMAL
MONOCYTES # BLD AUTO: 0.4 X10E3/UL (ref 0.1–0.9)
MONOCYTES NFR BLD AUTO: 7 %
NEUTROPHILS # BLD AUTO: 4.8 X10E3/UL (ref 1.4–7)
NEUTROPHILS NFR BLD AUTO: 78 %
PLATELET # BLD AUTO: 218 X10E3/UL (ref 150–450)
POTASSIUM SERPL-SCNC: 4.4 MMOL/L (ref 3.5–5.2)
PROT SERPL-MCNC: 6.5 G/DL (ref 6–8.5)
PROTHROMBIN TIME: 22 SEC (ref 9.1–12)
RBC # BLD AUTO: 4.93 X10E6/UL (ref 3.77–5.28)
SODIUM SERPL-SCNC: 139 MMOL/L (ref 134–144)
TRIGL SERPL-MCNC: 293 MG/DL (ref 0–149)
TSH SERPL DL<=0.005 MIU/L-ACNC: 1.85 UIU/ML (ref 0.45–4.5)
VLDLC SERPL CALC-MCNC: 50 MG/DL (ref 5–40)
WBC # BLD AUTO: 6.2 X10E3/UL (ref 3.4–10.8)

## 2024-05-10 ENCOUNTER — ANTICOAGULATION VISIT (OUTPATIENT)
Dept: INTERNAL MEDICINE | Facility: CLINIC | Age: 84
End: 2024-05-10
Payer: MEDICARE

## 2024-05-14 ENCOUNTER — TELEPHONE (OUTPATIENT)
Dept: INTERNAL MEDICINE | Facility: CLINIC | Age: 84
End: 2024-05-14
Payer: MEDICARE

## 2024-05-14 ENCOUNTER — ANTICOAGULATION VISIT (OUTPATIENT)
Dept: INTERNAL MEDICINE | Facility: CLINIC | Age: 84
End: 2024-05-14
Payer: MEDICARE

## 2024-05-14 LAB — INR PPP: 5.1 (ref 2–3)

## 2024-05-14 NOTE — TELEPHONE ENCOUNTER
I s/w Linda and she said that she takes 5 MG of Warfarin Mondays - Thursdays and 2.5 MG Fridays - Sundays.  She did say that for the last 2 Thursdays that she has taken 1 tablet of Fluticasone.  Her INR was 5.1 today.     Thank you

## 2024-05-14 NOTE — TELEPHONE ENCOUNTER
I s/w the patient, after speaking with Dr. Hall, and let her know to take 5 MG of Warfarin on Mondays and Tuesdays and take 2.5 mg the rest of the week.  I also told her to check it again next Monday or Tuesday and let us know the result.

## 2024-05-22 ENCOUNTER — ANTICOAGULATION VISIT (OUTPATIENT)
Dept: INTERNAL MEDICINE | Facility: CLINIC | Age: 84
End: 2024-05-22
Payer: MEDICARE

## 2024-05-22 LAB — INR PPP: 3.4 (ref 2–3)

## 2024-05-28 ENCOUNTER — TELEPHONE (OUTPATIENT)
Dept: INTERNAL MEDICINE | Facility: CLINIC | Age: 84
End: 2024-05-28
Payer: MEDICARE

## 2024-05-29 NOTE — TELEPHONE ENCOUNTER
Looks better than last check, would have her recheck in 1 week hopefully then back down to goal range

## 2024-05-31 ENCOUNTER — OFFICE VISIT (OUTPATIENT)
Dept: CARDIOLOGY | Facility: CLINIC | Age: 84
End: 2024-05-31
Payer: MEDICARE

## 2024-05-31 VITALS
WEIGHT: 212.7 LBS | HEIGHT: 62 IN | SYSTOLIC BLOOD PRESSURE: 120 MMHG | HEART RATE: 98 BPM | DIASTOLIC BLOOD PRESSURE: 60 MMHG | BODY MASS INDEX: 39.14 KG/M2

## 2024-05-31 DIAGNOSIS — I50.20 HFREF (HEART FAILURE WITH REDUCED EJECTION FRACTION): ICD-10-CM

## 2024-05-31 DIAGNOSIS — I10 ESSENTIAL HYPERTENSION: ICD-10-CM

## 2024-05-31 DIAGNOSIS — I27.20 PULMONARY HYPERTENSION: Primary | ICD-10-CM

## 2024-05-31 DIAGNOSIS — I48.21 PERMANENT ATRIAL FIBRILLATION: ICD-10-CM

## 2024-05-31 RX ORDER — POTASSIUM CHLORIDE 750 MG/1
TABLET, EXTENDED RELEASE ORAL
COMMUNITY
Start: 2024-05-07

## 2024-05-31 NOTE — PROGRESS NOTES
CARDIOLOGY        Patient Name: Linda Martel  :1940  Age: 84 y.o.  Primary Cardiologist: Luh Mejia MD  Encounter Provider:  Donavan Piña PA-C    Date of Service: 24        CHIEF COMPLAINT / REASON FOR OFFICE VISIT     Follow-up      HISTORY OF PRESENT ILLNESS       HPI  Linda Martel is a 84 y.o. female who presents today for 3-month follow-up.       Pt has a  history significant for permanent atrial fibrillation, HFpEF, hypertension, pulmonary hypertension, diabetes, obesity, and JUANITA presents for a 3-month follow-up.     On 2024 pt was doing well since her last visit with Dr. Mejia 3 months ago.  Patient was taken off her spironolactone and had bumped her Bumex.  Patient has been taking it on days that she does not feel good and responds well.  Patient otherwise has been doing very well since.  Patient states that she is felt the best she is ever felt.  Patient denies any chest pain, palpitations, lightheadedness, orthopnea, PND, or new swelling to her legs.  Patient does home checks for her Coumadin and last check was a week ago and her INR was 2.9.  Patient has not had any issues with spontaneous bleeding or bruising.    Since last visit she has had some intermittent dyspnea exertion but is very mild.  Patient occasionally will have shortness of breath.  Patient walked a long distance today to come to the appointment and did not have any issues.  Patient has had some infrequent palpitations that lasts few seconds as well.  They are not very bothersome to patient.  No chest pain, swelling to legs, no orthopnea, weight gain, or any bleeding issues.       Below is a summary of pertinent cardiology findings:  She moved here in 2016 from [place] and has known history of persistent atrial fibrillation.  She moved here to be closer to family.  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  She also has history of  difficult to control blood pressures.  January 2017 she was seen for dyspnea and chest pressure; stress nuclear perfusion study showed a small to medium sized area of mild ischemia in the anterior wall.  Echocardiogram showed EF 62%, with mild to moderate left atrial enlargement, mild to moderate mitral insufficiency, mildly reduced LV systolic function mild aortic insufficiency, and moderate pulmonary hypertension.  She was treated medically.  June 2017 she ended up having cardiac catheterization which showed normal coronary arteries with a right dominant RCA, normal LV systolic function, and normal LVEDP.  She did have abnormal pulmonary function test; follow-up high-resolution CT of the chest showed mosaic pattern in the lung.  March 2018 admitted to [facility] for acute respiratory failure secondary to pulmonary hypertension and acute on chronic diastolic heart failure.  Echocardiogram showed EF 57%, mild LVH, moderate right atrial enlargement and left atrial enlargement, and severely elevated RVSP.  She was diuresed and lost 9 L. RHC showed mixed pulmonary hypertension and PCWP 25-30 mmHg.   June 2018 right heart catheterization showed right atrial pressure 10 mmHg, RV pressure 51/11 mmHg, PAP 52/22 with a mean 29 mmHg, wedge pressure 18 mmHg, cardiac index 2.3 L/m/m², and LVEDP 12 mmHg.  Pulmonary pressures did not change with adenosine; consistent with moderate pulmonary hypertension.  July 2020 admitted with CHF and uncontrolled A-fib; her home dose of Lasix was increased.  Echocardiogram showed EF 27%, global hypokinesis, severe RV dilation with severely reduced RV systolic function, and RVSP greater than 55 mmHg.  Right heart catheterization showed PCWP 27 mmHg, pulmonary artery pressure 59/617 with mean of 39 mmHg, RV pressure 56/8 with a right atrial pressure of 12 mmHg, cardiac index 1.51 L/min/m² with cardiac output 2.92 L/min calculated by Brittany.  No significant coronary artery disease.  Findings  "were consistent with nonischemic cardiomyopathy; she was started on Entresto and released to hospice.  November 2021 echocardiogram showed EF 46 to 50% with mild global hypokinesis, mild AS, and no other significant abnormalities.      The following portions of the patient's history were reviewed and updated as appropriate: allergies, current medications, past family history, past medical history, past social history, past surgical history and problem list.      VITAL SIGNS     Visit Vitals  /60 (BP Location: Left arm)   Pulse 98   Ht 157.5 cm (62\")   Wt 96.5 kg (212 lb 11.2 oz)   BMI 38.90 kg/m²       @[email or web address]  Wt Readings from Last 3 Encounters:   05/31/24 96.5 kg (212 lb 11.2 oz)   05/01/24 98.2 kg (216 lb 6.4 oz)   01/18/24 98 kg (216 lb)     Body mass index is 38.9 kg/m².        PHYSICAL EXAMINATION     Constitutional:       General: Awake. Not in acute distress.     Appearance: Not in distress.   Pulmonary:      Effort: Pulmonary effort is normal.      Breath sounds: Normal breath sounds.   Cardiovascular:      Normal rate. Irregularly irregular rhythm.      Murmurs: There is no murmur.   Edema:     Peripheral edema absent.   Skin:     General: Skin is warm.   Neurological:      Mental Status: Alert.   Psychiatric:         Behavior: Behavior is cooperative.           REVIEWED DATA       ECG 12 Lead    Date/Time: 5/31/2024 2:23 PM  Performed by: Donavan Piña PA-C    Authorized by: Donavan Piña PA-C  Comparison: compared with previous ECG   Similar to previous ECG  Rhythm: atrial fibrillation  BPM: 93    Clinical impression: abnormal EKG          Cardiac Procedures:      Transthoracic echo on 5/20/2023  Interpretation Summary       Left ventricular systolic function is mildly decreased. Calculated left ventricular EF = 41.8% Left ventricular ejection fraction appears to be 46 - 50%. Definity was not administered as IV access was limited. With varying RR intervals ejection " fraction appeared different and overall was 45 to 50%, mildly reduced. Normal left ventricular cavity size noted. Left ventricular wall thickness is consistent with mild concentric hypertrophy. There is left ventricular global hypokinesis noted. Left ventricular diastolic function was indeterminate.    The right ventricular cavity is borderline dilated. Normal right ventricular wall thickness noted. Mildly reduced right ventricular systolic function noted.    The left atrial cavity is severely dilated. The right atrial cavity is severely dilated.    There is moderate calcification of the aortic valve. The aortic valve appears trileaflet. No aortic valve regurgitation is present. Mild aortic valve stenosis is present.    Trace mitral valve regurgitation is present.    Trace tricuspid valve regurgitation is present. Estimated right ventricular systolic pressure from tricuspid regurgitation is normal (<35 mmHg).     Cardiac catheterization on 7/8/2020  POST-PROCEDURE DIAGNOSIS:   1.  Normal coronary arteries  2.  Severe pulmonary hypertension  3.  Elevated left ventricular filling pressures     RECOMMENDATIONS:   Continue diuresis and medical management of cardiomyopathy.     DATE OF EXAM: 03/30/2018  11:10   EXAMINATION(S): CARDIAC CATHERIZATION     FINAL REPORT      Procedure     Procedure Type      Diagnostic procedure:CARDIAC CATHERIZATION, RIGHT HEART CATH, ULTRASOUND    GUIDED VASCULAR ACCESS     The procedure was explained in detail to the patient. Risks, complications and   alternative treatments were reviewed. Written consent was obtained.     Indications: Pulmonary hypertension.     Complications: No Complication.      Conclusions     Diagnostic Summary/Impression   1. Mixed pulmonary hypertension is identified.   2. There is an elevated pulmonary capillary wedge pressure between 25-30 mmHg   (but with elevated transpulmonary gradient consistent with some primary   pulmonary hypertension component).   3.  Reasonable response to diuresis with an estimated right atrial pressure of   10 mmHg.   4. No response to inhaled nitric oxide (no change in pulmonary pressures at up   to 80 PPM of inhaled NO).   Diagnostic Recommendations   1. Continue oral diuretics and optimization of HR control in atrial   fibrillation.   2. Continue oral diltiazem and continue optimization of JUANITA therapies.   3. Further recommendations will depend upon the patient's clinical course.      Procedure Data     Procedure Date   Date: 03/30/2018Start: 11:10      Lipid Panel          11/2/2023    15:15 5/1/2024    12:06   Lipid Panel   Total Cholesterol 184  197    Triglycerides 347  293    HDL Cholesterol 34  38    VLDL Cholesterol 58  50    LDL Cholesterol  92  109        Lab Results   Component Value Date     05/01/2024     11/02/2023    K 4.4 05/01/2024    K 4.6 11/02/2023    CL 98 05/01/2024     11/02/2023    CO2 24 05/01/2024    CO2 31.1 (H) 11/02/2023    BUN 32 (H) 05/01/2024    BUN 33 (H) 11/02/2023    CREATININE 1.33 (H) 05/01/2024    CREATININE 1.27 (H) 11/02/2023    EGFRIFNONA 32 (L) 01/12/2022    EGFRIFNONA 31 (L) 12/02/2021    EGFRIFAFRI 37 (L) 01/12/2022    EGFRIFAFRI 41 (L) 11/12/2021    GLUCOSE 106 (H) 05/01/2024    GLUCOSE 134 (H) 11/02/2023    CALCIUM 9.4 05/01/2024    CALCIUM 9.8 11/02/2023    PROTENTOTREF 6.5 05/01/2024    PROTENTOTREF 6.3 11/02/2023    ALBUMIN 4.3 05/01/2024    ALBUMIN 4.4 11/02/2023    BILITOT 1.0 05/01/2024    BILITOT 0.8 11/02/2023    AST 13 05/01/2024    AST 14 11/02/2023    ALT 10 05/01/2024    ALT 11 11/02/2023     Lab Results   Component Value Date    WBC 6.2 05/01/2024    WBC 5.15 05/28/2023    HGB 14.4 05/01/2024    HGB 13.3 05/28/2023    HCT 45.1 05/01/2024    HCT 38.8 05/28/2023    MCV 92 05/01/2024    MCV 89.2 05/28/2023     05/01/2024     05/28/2023     Lab Results   Component Value Date    PROBNP 1,268.0 05/27/2023    PROBNP 1,157.0 11/11/2022    .0 (H)  03/26/2018     Lab Results   Component Value Date    TROPONINI <0.012 03/26/2018    TROPONINT 24 (H) 05/28/2023     Lab Results   Component Value Date    TSH 1.850 05/01/2024    TSH 3.790 02/06/2023             ASSESSMENT & PLAN     Diagnoses and all orders for this visit:       1. HFrEF (heart failure with reduced ejection fraction) (Primary)              Intermittent CHARLES.   1+ edema to her legs and states that her swelling is good for her.  Patient continue her Bumex.  Will check a echocardiogram to assess for LVEF     2. Essential hypertension              Well-controlled     3. Pulmonary hypertension              Mixed etiology     4. Permanent atrial fibrillation              [id number] score of 6.  Patient is anticoagulated on warfarin and rate control.  Last INR checked was 3.4      5. Type 2 diabetes mellitus with hyperglycemia, without long-term current use of insulin              Hemoglobin A1c on 5/1/2024 was 6.2.  Followed by PCP.    6. Class 2 severe obesity due to excess calories with serious comorbidity and body mass index (BMI) of 38.0 to 38.9 in adult              BMI of 39.5.  Has issues with mobility and is sedentary.     7. Mixed hyperlipidemia              Currently on rosuvastatin.     8. JUANITA (obstructive sleep apnea)              Compliant with CPAP.  Patient [facility] Antonio [facility].  I reviewed his note on 9/28/2020     9. Drug therapy monitoring/high risk              Anticoagulated on warfarin.  Patient to continue having labs and exams for evaluation of intensive therapeutic monitoring.     Patient will follow-up with Dr. Mejia in 6 months.  Patient to continue her current medication regimen.  Will check a echocardiogram to assess her LV.  Patient told if continues to have worsening palpitations we will need another monitor.  Is otherwise stable.    Return in about 6 months (around 11/30/2024) for Dr. Mejia.    Future Appointments         Provider Department Center     7/11/2024 2:30 PM [facility]JA [facility] DEXA 1 [facility] BONE DENSITY UCE    8/9/2024 1:30 PM Alejandro Hall MD [facility] [place]    1/17/2025 1:00 PM Luh Mejia MD [facility] CARDIOLOGY [place]                MEDICATIONS         Discharge Medications            Accurate as of May 31, 2024  2:41 PM. If you have any questions, ask your nurse or doctor.                Changes to Medications        Instructions Start Date   warfarin 2.5 MG tablet  Commonly known as: COUMADIN  What changed: additional instructions   5 mg 2 days , 2.5 all other days             Continue These Medications        Instructions Start Date   Accu-Chek Guide test strip  Generic drug: glucose blood   Use as instructed to check blood glucose once a day.      bumetanide 2 MG tablet  Commonly known as: BUMEX   4 mg, Oral, 2 Times Daily      Clotrimazole 1 % ointment   1 application , Apply externally, 2 Times Daily      dilTIAZem  MG 24 hr capsule  Commonly known as: CARDIZEM CD   120 mg, Oral, Daily      doxycycline 100 MG capsule  Commonly known as: VIBRAMYCIN   100 mg, Oral, 2 Times Daily      empagliflozin 10 MG tablet tablet  Commonly known as: Jardiance   10 mg, Oral, Daily      ferrous sulfate 325 (65 FE) MG tablet   1 tab PO daily Monday, Wednesday, Friday      Gabapentin 10 % cream   1 Application, Apply externally, Nightly      HYDROcodone-acetaminophen 5-325 MG per tablet  Commonly known as: Norco   1 tablet, Oral, Every 6 Hours PRN      hydrocortisone 2.5 % cream   1 application , Topical, 2 Times Daily      Insulin Glargine 100 UNIT/ML injection pen  Commonly known as: LANTUS SOLOSTAR   15 Units, Subcutaneous, Nightly      metFORMIN 500 MG tablet  Commonly known as: GLUCOPHAGE   500 mg, Oral, 2 Times Daily With Meals      metoprolol succinate  MG 24 hr tablet  Commonly known as: TOPROL-XL   100 mg, Oral, 2 Times Daily      nitroglycerin 0.4 MG SL  tablet  Commonly known as: NITROSTAT   0.4 mg, Sublingual, Every 5 Minutes PRN, Take no more than 3 doses in 15 minutes.       nystatin 010266 UNIT/GM powder  Commonly known as: MYCOSTATIN   Topical, See Admin Instructions, Apply topically to the affected area three times daily as directed      nystatin-triamcinolone 755528-9.1 UNIT/GM-% ointment  Commonly known as: MYCOLOG   1 Application, Topical, 2 Times Daily      omeprazole 40 MG capsule  Commonly known as: priLOSEC   40 mg, Oral, As Needed      OneTouch Delica Plus [id number] misc   USE TO TEST BLOOD SUGAR 3 TO 4 TIMES DAILY      potassium chloride 10 MEQ CR tablet   10 mEq, Oral, Nightly      potassium chloride 10 MEQ CR tablet  Commonly known as: KLOR-CON M10       PROBIOTIC PO   1 tablet, Oral, 2 Times Daily      prochlorperazine 10 MG tablet  Commonly known as: COMPAZINE   TAKE ONE TABLET BY MOUTH EVERY 6 HOURS AS NEEDED FOR NAUSEA AND VOMITING      sacubitril-valsartan 49-51 MG tablet  Commonly known as: ENTRESTO   1 tablet, Oral, 2 Times Daily      senna 8.6 MG tablet  Commonly known as: SENOKOT   2 tablets, Oral, Nightly PRN, At bedtime       vitamin B-12 1000 MCG tablet  Commonly known as: CYANOCOBALAMIN   TAKE ONE TABLET BY MOUTH DAILY      vitamin E 400 UNIT capsule   TAKE ONE CAPSULE BY MOUTH DAILY      ZOFRAN PO   4 mg, Oral, Every 4 Hours PRN                   **Dragon Disclaimer:   Much of this encounter note is an electronic transcription/translation of spoken language to printed text. The electronic translation of spoken language may permit erroneous, or at times, nonsensical words or phrases to be inadvertently transcribed. Although I have reviewed the note for such errors, some may still exist.

## 2024-06-05 ENCOUNTER — ANTICOAGULATION VISIT (OUTPATIENT)
Dept: INTERNAL MEDICINE | Facility: CLINIC | Age: 84
End: 2024-06-05
Payer: MEDICARE

## 2024-06-05 LAB — INR PPP: 4.2 (ref 2–3)

## 2024-06-12 RX ORDER — WARFARIN SODIUM 1 MG/1
TABLET ORAL
Qty: 30 TABLET | Refills: 1 | Status: SHIPPED | OUTPATIENT
Start: 2024-06-12

## 2024-06-13 ENCOUNTER — TELEPHONE (OUTPATIENT)
Dept: INTERNAL MEDICINE | Facility: CLINIC | Age: 84
End: 2024-06-13
Payer: MEDICARE

## 2024-06-14 ENCOUNTER — HOSPITAL ENCOUNTER (OUTPATIENT)
Dept: CARDIOLOGY | Facility: HOSPITAL | Age: 84
Discharge: HOME | End: 2024-06-14
Payer: MEDICARE

## 2024-06-14 VITALS
SYSTOLIC BLOOD PRESSURE: 138 MMHG | HEIGHT: 62 IN | BODY MASS INDEX: 38.95 KG/M2 | DIASTOLIC BLOOD PRESSURE: 61 MMHG | WEIGHT: 211.64 LBS | HEART RATE: 92 BPM

## 2024-06-14 DIAGNOSIS — I48.21 PERMANENT ATRIAL FIBRILLATION: ICD-10-CM

## 2024-06-14 DIAGNOSIS — I50.20 HFREF (HEART FAILURE WITH REDUCED EJECTION FRACTION): ICD-10-CM

## 2024-06-14 DIAGNOSIS — I10 ESSENTIAL HYPERTENSION: ICD-10-CM

## 2024-06-14 DIAGNOSIS — I27.20 PULMONARY HYPERTENSION: ICD-10-CM

## 2024-06-14 LAB
AORTIC DIMENSIONLESS INDEX: 0.4 (DI)
ASCENDING AORTA: 3.2 CM
BH CV ECHO MEAS - AO MAX PG: 12.8 MMHG
BH CV ECHO MEAS - AO MEAN PG: 7 MMHG
BH CV ECHO MEAS - AO V2 MAX: 179 CM/SEC
BH CV ECHO MEAS - AO V2 VTI: 30.7 CM
BH CV ECHO MEAS - AVA(I,D): 1.31 CM2
BH CV ECHO MEAS - EDV(CUBED): 74.1 ML
BH CV ECHO MEAS - EDV(MOD-SP2): 51 ML
BH CV ECHO MEAS - EDV(MOD-SP4): 70 ML
BH CV ECHO MEAS - EF(MOD-BP): 51.9 %
BH CV ECHO MEAS - EF(MOD-SP2): 47.1 %
BH CV ECHO MEAS - EF(MOD-SP4): 52.9 %
BH CV ECHO MEAS - ESV(CUBED): 20.9 ML
BH CV ECHO MEAS - ESV(MOD-SP2): 27 ML
BH CV ECHO MEAS - ESV(MOD-SP4): 33 ML
BH CV ECHO MEAS - FS: 34.4 %
BH CV ECHO MEAS - IVS/LVPW: 1.08 CM
BH CV ECHO MEAS - IVSD: 1.3 CM
BH CV ECHO MEAS - LV DIASTOLIC VOL/BSA (35-75): 35.8 CM2
BH CV ECHO MEAS - LV MASS(C)D: 189.2 GRAMS
BH CV ECHO MEAS - LV MAX PG: 1.98 MMHG
BH CV ECHO MEAS - LV MEAN PG: 1 MMHG
BH CV ECHO MEAS - LV SYSTOLIC VOL/BSA (12-30): 16.9 CM2
BH CV ECHO MEAS - LV V1 MAX: 70.4 CM/SEC
BH CV ECHO MEAS - LV V1 VTI: 13.3 CM
BH CV ECHO MEAS - LVIDD: 4.2 CM
BH CV ECHO MEAS - LVIDS: 2.8 CM
BH CV ECHO MEAS - LVOT AREA: 3 CM2
BH CV ECHO MEAS - LVOT DIAM: 1.97 CM
BH CV ECHO MEAS - LVPWD: 1.2 CM
BH CV ECHO MEAS - MV DEC SLOPE: 685.7 CM/SEC2
BH CV ECHO MEAS - MV MAX PG: 6.7 MMHG
BH CV ECHO MEAS - MV MEAN PG: 1.76 MMHG
BH CV ECHO MEAS - MV P1/2T: 58.9 MSEC
BH CV ECHO MEAS - MV V2 VTI: 20.5 CM
BH CV ECHO MEAS - MVA(P1/2T): 3.7 CM2
BH CV ECHO MEAS - MVA(VTI): 1.97 CM2
BH CV ECHO MEAS - PA ACC TIME: 0.06 SEC
BH CV ECHO MEAS - PA V2 MAX: 102 CM/SEC
BH CV ECHO MEAS - QP/QS: 2.25
BH CV ECHO MEAS - RAP SYSTOLE: 3 MMHG
BH CV ECHO MEAS - RV MAX PG: 2.7 MMHG
BH CV ECHO MEAS - RV V1 MAX: 81.8 CM/SEC
BH CV ECHO MEAS - RV V1 VTI: 16.1 CM
BH CV ECHO MEAS - RVOT DIAM: 2.7 CM
BH CV ECHO MEAS - RVSP: 49 MMHG
BH CV ECHO MEAS - SV(LVOT): 40.4 ML
BH CV ECHO MEAS - SV(MOD-SP2): 24 ML
BH CV ECHO MEAS - SV(MOD-SP4): 37 ML
BH CV ECHO MEAS - SV(RVOT): 90.7 ML
BH CV ECHO MEAS - SVI(LVOT): 20.7 ML/M2
BH CV ECHO MEAS - SVI(MOD-SP2): 12.3 ML/M2
BH CV ECHO MEAS - SVI(MOD-SP4): 18.9 ML/M2
BH CV ECHO MEAS - TR MAX PG: 46.8 MMHG
BH CV ECHO MEAS - TR MAX VEL: 342.2 CM/SEC
BH CV XLRA - RV BASE: 3.9 CM
BH CV XLRA - RV LENGTH: 7.3 CM
BH CV XLRA - RV MID: 3.4 CM
BH CV XLRA - TDI S': 6.8 CM/SEC
LEFT ATRIUM VOLUME INDEX: 43 ML/M2
SINUS: 2.8 CM

## 2024-06-14 PROCEDURE — 25510000001 PERFLUTREN (DEFINITY) 8.476 MG IN SODIUM CHLORIDE (PF) 0.9 % 10 ML INJECTION: Performed by: INTERNAL MEDICINE

## 2024-06-14 PROCEDURE — 93306 TTE W/DOPPLER COMPLETE: CPT

## 2024-06-14 RX ADMIN — SODIUM CHLORIDE 2 ML: 9 INJECTION INTRAMUSCULAR; INTRAVENOUS; SUBCUTANEOUS at 16:09

## 2024-06-20 ENCOUNTER — ANTICOAGULATION VISIT (OUTPATIENT)
Dept: INTERNAL MEDICINE | Facility: CLINIC | Age: 84
End: 2024-06-20
Payer: MEDICARE

## 2024-06-20 LAB — INR PPP: 2.7 (ref 2–3)

## 2024-07-01 ENCOUNTER — TELEPHONE (OUTPATIENT)
Dept: INTERNAL MEDICINE | Facility: CLINIC | Age: 84
End: 2024-07-01
Payer: MEDICARE

## 2024-07-01 NOTE — TELEPHONE ENCOUNTER
Called to schedule patient for a Nurse/MA visit, pt stated she checks her INR herself and she will check it in the morning.

## 2024-07-02 LAB — INR PPP: 2 (ref 2–3)

## 2024-07-05 ENCOUNTER — ANTICOAGULATION VISIT (OUTPATIENT)
Dept: INTERNAL MEDICINE | Facility: CLINIC | Age: 84
End: 2024-07-05
Payer: MEDICARE

## 2024-07-10 LAB — INR PPP: 1.7 (ref 2–3)

## 2024-07-11 ENCOUNTER — ANTICOAGULATION VISIT (OUTPATIENT)
Dept: INTERNAL MEDICINE | Facility: CLINIC | Age: 84
End: 2024-07-11
Payer: MEDICARE

## 2024-07-11 ENCOUNTER — HOSPITAL ENCOUNTER (OUTPATIENT)
Dept: BONE DENSITY | Facility: HOSPITAL | Age: 84
Discharge: HOME | End: 2024-07-11
Admitting: INTERNAL MEDICINE
Payer: MEDICARE

## 2024-07-11 PROCEDURE — 77080 DXA BONE DENSITY AXIAL: CPT

## 2024-07-22 LAB — INR PPP: 2.1 (ref 2–3)

## 2024-07-24 ENCOUNTER — ANTICOAGULATION VISIT (OUTPATIENT)
Dept: INTERNAL MEDICINE | Facility: CLINIC | Age: 84
End: 2024-07-24
Payer: MEDICARE

## 2024-07-24 NOTE — TELEPHONE ENCOUNTER
Spoke to pt about lab results, stated she needed needles for her injectables. Stated she used to have a RX for [id number]

## 2024-08-05 ENCOUNTER — ANTICOAGULATION VISIT (OUTPATIENT)
Dept: INTERNAL MEDICINE | Facility: CLINIC | Age: 84
End: 2024-08-05
Payer: MEDICARE

## 2024-08-05 LAB — INR PPP: 1.8 (ref 2–3)

## 2024-08-16 ENCOUNTER — OFFICE VISIT (OUTPATIENT)
Dept: INTERNAL MEDICINE | Facility: CLINIC | Age: 84
End: 2024-08-16
Payer: MEDICARE

## 2024-08-16 VITALS
SYSTOLIC BLOOD PRESSURE: 120 MMHG | HEART RATE: 51 BPM | OXYGEN SATURATION: 98 % | BODY MASS INDEX: 38.46 KG/M2 | TEMPERATURE: 97.5 F | WEIGHT: 209 LBS | HEIGHT: 62 IN | DIASTOLIC BLOOD PRESSURE: 80 MMHG

## 2024-08-16 DIAGNOSIS — E78.2 MIXED HYPERLIPIDEMIA: ICD-10-CM

## 2024-08-16 DIAGNOSIS — I48.21 PERMANENT ATRIAL FIBRILLATION: ICD-10-CM

## 2024-08-16 DIAGNOSIS — I50.20 HFREF (HEART FAILURE WITH REDUCED EJECTION FRACTION): ICD-10-CM

## 2024-08-16 DIAGNOSIS — E11.65 TYPE 2 DIABETES MELLITUS WITH HYPERGLYCEMIA, WITHOUT LONG-TERM CURRENT USE OF INSULIN: ICD-10-CM

## 2024-08-16 DIAGNOSIS — L30.4 INTERTRIGO: Primary | ICD-10-CM

## 2024-08-16 DIAGNOSIS — I10 ESSENTIAL HYPERTENSION: ICD-10-CM

## 2024-08-16 PROCEDURE — 99214 OFFICE O/P EST MOD 30 MIN: CPT | Performed by: INTERNAL MEDICINE

## 2024-08-16 PROCEDURE — 3074F SYST BP LT 130 MM HG: CPT | Performed by: INTERNAL MEDICINE

## 2024-08-16 PROCEDURE — 3079F DIAST BP 80-89 MM HG: CPT | Performed by: INTERNAL MEDICINE

## 2024-08-16 PROCEDURE — 1126F AMNT PAIN NOTED NONE PRSNT: CPT | Performed by: INTERNAL MEDICINE

## 2024-08-16 RX ORDER — CLOTRIMAZOLE AND BETAMETHASONE DIPROPIONATE 10; .64 MG/G; MG/G
1 CREAM TOPICAL 2 TIMES DAILY
Qty: 45 G | Refills: 1 | Status: SHIPPED | OUTPATIENT
Start: 2024-08-16

## 2024-08-16 RX ORDER — SACCHAROMYCES BOULARDII 250 MG
250 CAPSULE ORAL 2 TIMES DAILY
Qty: 60 CAPSULE | Refills: 2 | Status: SHIPPED | OUTPATIENT
Start: 2024-08-16

## 2024-08-16 RX ORDER — FLUCONAZOLE 150 MG/1
300 TABLET ORAL WEEKLY
Qty: 8 TABLET | Refills: 0 | Status: SHIPPED | OUTPATIENT
Start: 2024-08-16

## 2024-08-16 NOTE — PROGRESS NOTES
"Chief Complaint  Diabetes    Subjective        Linda Martel presents to [facility]  History of Present Illness    Here for follow up   Intertrigo has been on and off worse and better, never completely gone  Meds reviewed for treatment of course has affected her INR when she is on antibiotic/antifungal  She is on doxycycline 100mg BID every day   Warfarin 2.5mg 5x/ 5mg 2x/week       Objective   Vital Signs:  /80   Pulse 51   Temp 97.5 °F (36.4 °C)   Ht 157 cm (61.81\")   Wt 94.8 kg (209 lb)   SpO2 98%   BMI 38.46 kg/m²   Estimated body mass index is 38.46 kg/m² as calculated from the following:    Height as of this encounter: 157 cm (61.81\").    Weight as of this encounter: 94.8 kg (209 lb).    Physical Exam  Vitals and nursing note reviewed.   Constitutional:       General: She is not in acute distress.     Appearance: Normal appearance.   HENT:      Head: Normocephalic and atraumatic.      Right Ear: External ear normal.      Left Ear: External ear normal.      Nose: Nose normal. No congestion.      Mouth/Throat:      Mouth: Mucous membranes are moist.      Pharynx: No oropharyngeal exudate or posterior oropharyngeal erythema.   Eyes:      Extraocular Movements: Extraocular movements intact.      Conjunctiva/sclera: Conjunctivae normal.      Pupils: Pupils are equal, round, and reactive to light.   Cardiovascular:      Rate and Rhythm: Normal rate and regular rhythm. Rhythm irregular.      Pulses: Normal pulses.      Heart sounds: Normal heart sounds. No murmur heard.  Pulmonary:      Effort: Pulmonary effort is normal. No respiratory distress.      Breath sounds: Rales present. No wheezing.   Musculoskeletal:      Cervical back: Normal range of motion.   Skin:     General: Skin is warm.      Capillary Refill: Capillary refill takes less than 2 seconds.      Findings: Rash present.   Neurological:      General: No focal deficit present.      Mental " Status: She is alert and oriented to person, place, and time. Mental status is at baseline.      Cranial Nerves: No cranial nerve deficit.   Psychiatric:         Mood and Affect: Mood normal.         Behavior: Behavior normal.         Thought Content: Thought content normal.        Result Review :  The following data was reviewed by: Alejandro Hall MD on 08/16/2024:  Common labs          11/2/2023    15:15 5/1/2024    12:06 8/16/2024    11:15   Common Labs   Glucose 134  106  84    BUN 33  32  26    Creatinine 1.27  1.33  1.55    Sodium 143  139  144    Potassium 4.6  4.4  4.9    Chloride 102  98  97    Calcium 9.8  9.4  10.2    Total Protein 6.3  6.5  6.7    Albumin 4.4  4.3  4.4    Total Bilirubin 0.8  1.0  1.3    Alkaline Phosphatase 94  117  94    AST (SGOT) 14  13  16    ALT (SGPT) 11  10  11    WBC  6.2  7.1    Hemoglobin  14.4  15.3    Hematocrit  45.1  48.4    Platelets  218  280    Total Cholesterol 184  197  213    Triglycerides 347  293  264    HDL Cholesterol 34  38  38    LDL Cholesterol  92  109  128    Hemoglobin A1C 6.10  6.2  5.8    Microalbumin, Urine  CANCELED       Data reviewed : prior office notes reviewed           Assessment and Plan   Diagnoses and all orders for this visit:    1. Intertrigo (Primary)  -     clotrimazole-betamethasone (LOTRISONE) 1-0.05 % cream; Apply 1 Application topically to the appropriate area as directed 2 (Two) Times a Day.  Dispense: 45 g; Refill: 1  -     fluconazole (DIFLUCAN) 150 MG tablet; Take 2 tablets by mouth 1 (One) Time Per Week.  Dispense: 8 tablet; Refill: 0  We probably need higher dose of diflucan as weight >90 kgs  Start fluconazole 300mg weekly x4 weeks  Change clotrimazole to clotrimazole betamethasone  Will stop jardiance as that may be worsening  May need derm assistance     2. Type 2 diabetes mellitus with hyperglycemia, without long-term current use of insulin  -     Hemoglobin A1c  -     Urinalysis With Microscopic - Urine, Clean Catch  Continue  current meds, will stop jardiance due to known culprit with fungal infections    3. Essential hypertension  -     Comprehensive metabolic panel    4. Mixed hyperlipidemia  -     Lipid panel  -     TSH  -     T4, free    5. Permanent atrial fibrillation  -     Protime-INR  -     CBC w AUTO Differential    6. HFrEF (heart failure with reduced ejection fraction)  -     Comprehensive metabolic panel    Other orders  -     saccharomyces boulardii (Florastor) 250 MG capsule; Take 1 capsule by mouth 2 (Two) Times a Day. Okay to substitute for equivalent lower price  Dispense: 60 capsule; Refill: 2  -     Microscopic Examination -           I spent 30 minutes caring for Linda on this date of service. This time includes time spent by me in the following activities:preparing for the visit, reviewing tests, obtaining and/or reviewing a separately obtained history, performing a medically appropriate examination and/or evaluation , counseling and educating the patient/family/caregiver, ordering medications, tests, or procedures, and documenting information in the medical record  Follow Up   Return in about 3 months (around 11/16/2024) for Recheck.  Patient was given instructions and counseling regarding her condition or for health maintenance advice. Please see specific information pulled into the AVS if appropriate.     Alejandro Hall MD  [facility]  [address]  Phone: [phone]

## 2024-08-17 LAB
ALBUMIN SERPL-MCNC: 4.4 G/DL (ref 3.7–4.7)
ALP SERPL-CCNC: 94 IU/L (ref 44–121)
ALT SERPL-CCNC: 11 IU/L (ref 0–32)
APPEARANCE UR: CLEAR
AST SERPL-CCNC: 16 IU/L (ref 0–40)
BACTERIA #/AREA URNS HPF: NORMAL /HPF
BASOPHILS # BLD AUTO: 0 X10E3/UL (ref 0–0.2)
BASOPHILS NFR BLD AUTO: 1 %
BILIRUB SERPL-MCNC: 1.3 MG/DL (ref 0–1.2)
BILIRUB UR QL STRIP: NEGATIVE
BUN SERPL-MCNC: 26 MG/DL (ref 8–27)
BUN/CREAT SERPL: 17 (ref 12–28)
CALCIUM SERPL-MCNC: 10.2 MG/DL (ref 8.7–10.3)
CASTS URNS MICRO: NORMAL
CHLORIDE SERPL-SCNC: 97 MMOL/L (ref 96–106)
CHOLEST SERPL-MCNC: 213 MG/DL (ref 100–199)
CO2 SERPL-SCNC: 26 MMOL/L (ref 20–29)
COLOR UR: YELLOW
CREAT SERPL-MCNC: 1.55 MG/DL (ref 0.57–1)
EGFRCR SERPLBLD CKD-EPI 2021: 33 ML/MIN/1.73
EOSINOPHIL # BLD AUTO: 0.2 X10E3/UL (ref 0–0.4)
EOSINOPHIL NFR BLD AUTO: 3 %
EPI CELLS #/AREA URNS HPF: NORMAL /HPF
ERYTHROCYTE [DISTWIDTH] IN BLOOD BY AUTOMATED COUNT: 13 % (ref 11.7–15.4)
GLOBULIN SER CALC-MCNC: 2.3 G/DL (ref 1.5–4.5)
GLUCOSE SERPL-MCNC: 84 MG/DL (ref 70–99)
GLUCOSE UR QL STRIP: ABNORMAL
HBA1C MFR BLD: 5.8 % (ref 4.8–5.6)
HCT VFR BLD AUTO: 48.4 % (ref 34–46.6)
HDLC SERPL-MCNC: 38 MG/DL
HGB BLD-MCNC: 15.3 G/DL (ref 11.1–15.9)
HGB UR QL STRIP: NEGATIVE
IMM GRANULOCYTES # BLD AUTO: 0 X10E3/UL (ref 0–0.1)
IMM GRANULOCYTES NFR BLD AUTO: 0 %
INR PPP: 2 (ref 0.9–1.2)
KETONES UR QL STRIP: NEGATIVE
LDLC SERPL CALC-MCNC: 128 MG/DL (ref 0–99)
LEUKOCYTE ESTERASE UR QL STRIP: ABNORMAL
LYMPHOCYTES # BLD AUTO: 1.2 X10E3/UL (ref 0.7–3.1)
LYMPHOCYTES NFR BLD AUTO: 17 %
MCH RBC QN AUTO: 30.1 PG (ref 26.6–33)
MCHC RBC AUTO-ENTMCNC: 31.6 G/DL (ref 31.5–35.7)
MCV RBC AUTO: 95 FL (ref 79–97)
MONOCYTES # BLD AUTO: 0.5 X10E3/UL (ref 0.1–0.9)
MONOCYTES NFR BLD AUTO: 6 %
NEUTROPHILS # BLD AUTO: 5.2 X10E3/UL (ref 1.4–7)
NEUTROPHILS NFR BLD AUTO: 73 %
NITRITE UR QL STRIP: NEGATIVE
PH UR STRIP: 6.5 [PH] (ref 5–8)
PLATELET # BLD AUTO: 280 X10E3/UL (ref 150–450)
POTASSIUM SERPL-SCNC: 4.9 MMOL/L (ref 3.5–5.2)
PROT SERPL-MCNC: 6.7 G/DL (ref 6–8.5)
PROT UR QL STRIP: NEGATIVE
PROTHROMBIN TIME: 20 SEC (ref 9.1–12)
RBC # BLD AUTO: 5.08 X10E6/UL (ref 3.77–5.28)
RBC #/AREA URNS HPF: NORMAL /HPF
SODIUM SERPL-SCNC: 144 MMOL/L (ref 134–144)
SP GR UR STRIP: 1.01 (ref 1–1.03)
T4 FREE SERPL-MCNC: 1.28 NG/DL (ref 0.82–1.77)
TRIGL SERPL-MCNC: 264 MG/DL (ref 0–149)
TSH SERPL DL<=0.005 MIU/L-ACNC: 2.12 UIU/ML (ref 0.45–4.5)
UROBILINOGEN UR STRIP-MCNC: ABNORMAL MG/DL
VLDLC SERPL CALC-MCNC: 47 MG/DL (ref 5–40)
WBC # BLD AUTO: 7.1 X10E3/UL (ref 3.4–10.8)
WBC #/AREA URNS HPF: NORMAL /HPF

## 2024-09-10 LAB — INR PPP: 4.5 (ref 2–3)

## 2024-09-11 ENCOUNTER — ANTICOAGULATION VISIT (OUTPATIENT)
Dept: INTERNAL MEDICINE | Facility: CLINIC | Age: 84
End: 2024-09-11
Payer: MEDICARE

## 2024-09-11 DIAGNOSIS — L30.4 INTERTRIGO: ICD-10-CM

## 2024-09-11 RX ORDER — WARFARIN SODIUM 1 MG/1
TABLET ORAL
Qty: 30 TABLET | Refills: 1 | Status: SHIPPED | OUTPATIENT
Start: 2024-09-11

## 2024-09-11 RX ORDER — FLUCONAZOLE 150 MG/1
300 TABLET ORAL WEEKLY
Qty: 8 TABLET | Refills: 1 | Status: SHIPPED | OUTPATIENT
Start: 2024-09-11

## 2024-09-13 ENCOUNTER — TELEPHONE (OUTPATIENT)
Dept: INTERNAL MEDICINE | Facility: CLINIC | Age: 84
End: 2024-09-13
Payer: MEDICARE

## 2024-09-17 LAB — INR PPP: 3.2 (ref 2–3)

## 2024-09-19 ENCOUNTER — ANTICOAGULATION VISIT (OUTPATIENT)
Dept: INTERNAL MEDICINE | Facility: CLINIC | Age: 84
End: 2024-09-19
Payer: MEDICARE

## 2024-09-24 LAB — INR PPP: 1.9 (ref 2–3)

## 2024-09-26 ENCOUNTER — ANTICOAGULATION VISIT (OUTPATIENT)
Dept: INTERNAL MEDICINE | Facility: CLINIC | Age: 84
End: 2024-09-26
Payer: MEDICARE

## 2024-10-02 ENCOUNTER — ANTICOAGULATION VISIT (OUTPATIENT)
Dept: INTERNAL MEDICINE | Facility: CLINIC | Age: 84
End: 2024-10-02
Payer: MEDICARE

## 2024-10-02 LAB — INR PPP: 1.6 (ref 2–3)

## 2024-10-09 LAB — INR PPP: 2.2 (ref 2–3)

## 2024-10-10 ENCOUNTER — ANTICOAGULATION VISIT (OUTPATIENT)
Dept: INTERNAL MEDICINE | Facility: CLINIC | Age: 84
End: 2024-10-10
Payer: MEDICARE

## 2024-10-16 ENCOUNTER — ANTICOAGULATION VISIT (OUTPATIENT)
Dept: INTERNAL MEDICINE | Facility: CLINIC | Age: 84
End: 2024-10-16
Payer: MEDICARE

## 2024-10-16 LAB — INR PPP: 1.6 (ref 2–3)

## 2024-10-30 LAB — INR PPP: 2.8 (ref 2–3)

## 2024-10-31 ENCOUNTER — ANTICOAGULATION VISIT (OUTPATIENT)
Dept: INTERNAL MEDICINE | Facility: CLINIC | Age: 84
End: 2024-10-31
Payer: MEDICARE

## 2024-11-15 ENCOUNTER — OFFICE VISIT (OUTPATIENT)
Dept: INTERNAL MEDICINE | Facility: CLINIC | Age: 84
End: 2024-11-15
Payer: MEDICARE

## 2024-11-15 VITALS
DIASTOLIC BLOOD PRESSURE: 88 MMHG | SYSTOLIC BLOOD PRESSURE: 140 MMHG | WEIGHT: 222.6 LBS | BODY MASS INDEX: 40.96 KG/M2 | HEIGHT: 62 IN | HEART RATE: 89 BPM | OXYGEN SATURATION: 96 %

## 2024-11-15 DIAGNOSIS — E11.65 TYPE 2 DIABETES MELLITUS WITH HYPERGLYCEMIA, WITHOUT LONG-TERM CURRENT USE OF INSULIN: Primary | ICD-10-CM

## 2024-11-15 DIAGNOSIS — E78.5 HYPERLIPIDEMIA, UNSPECIFIED HYPERLIPIDEMIA TYPE: ICD-10-CM

## 2024-11-15 DIAGNOSIS — R10.32 LEFT LOWER QUADRANT ABDOMINAL PAIN: ICD-10-CM

## 2024-11-15 DIAGNOSIS — I10 ESSENTIAL HYPERTENSION: ICD-10-CM

## 2024-11-15 DIAGNOSIS — I50.20 HFREF (HEART FAILURE WITH REDUCED EJECTION FRACTION): ICD-10-CM

## 2024-11-15 LAB — INR PPP: 2.4 (ref 2–3)

## 2024-11-15 PROCEDURE — 3077F SYST BP >= 140 MM HG: CPT | Performed by: INTERNAL MEDICINE

## 2024-11-15 PROCEDURE — 1126F AMNT PAIN NOTED NONE PRSNT: CPT | Performed by: INTERNAL MEDICINE

## 2024-11-15 PROCEDURE — 3079F DIAST BP 80-89 MM HG: CPT | Performed by: INTERNAL MEDICINE

## 2024-11-15 PROCEDURE — 99214 OFFICE O/P EST MOD 30 MIN: CPT | Performed by: INTERNAL MEDICINE

## 2024-11-16 LAB
ALBUMIN SERPL-MCNC: 3.9 G/DL (ref 3.5–5.2)
ALBUMIN/GLOB SERPL: 2 G/DL
ALP SERPL-CCNC: 80 U/L (ref 39–117)
ALT SERPL-CCNC: 9 U/L (ref 1–33)
AST SERPL-CCNC: 14 U/L (ref 1–32)
BASOPHILS # BLD AUTO: 0.04 10*3/MM3 (ref 0–0.2)
BASOPHILS NFR BLD AUTO: 0.7 % (ref 0–1.5)
BILIRUB SERPL-MCNC: 0.8 MG/DL (ref 0–1.2)
BUN SERPL-MCNC: 36 MG/DL (ref 8–23)
BUN/CREAT SERPL: 20.9 (ref 7–25)
CALCIUM SERPL-MCNC: 9.3 MG/DL (ref 8.6–10.5)
CHLORIDE SERPL-SCNC: 94 MMOL/L (ref 98–107)
CHOLEST SERPL-MCNC: 177 MG/DL (ref 0–200)
CO2 SERPL-SCNC: 32 MMOL/L (ref 22–29)
CREAT SERPL-MCNC: 1.72 MG/DL (ref 0.57–1)
EGFRCR SERPLBLD CKD-EPI 2021: 29.2 ML/MIN/1.73
EOSINOPHIL # BLD AUTO: 0.21 10*3/MM3 (ref 0–0.4)
EOSINOPHIL NFR BLD AUTO: 3.5 % (ref 0.3–6.2)
ERYTHROCYTE [DISTWIDTH] IN BLOOD BY AUTOMATED COUNT: 13.2 % (ref 12.3–15.4)
GLOBULIN SER CALC-MCNC: 2 GM/DL
GLUCOSE SERPL-MCNC: 85 MG/DL (ref 65–99)
HBA1C MFR BLD: 5.7 % (ref 4.8–5.6)
HCT VFR BLD AUTO: 35.7 % (ref 34–46.6)
HDLC SERPL-MCNC: 37 MG/DL (ref 40–60)
HGB BLD-MCNC: 11.8 G/DL (ref 12–15.9)
IMM GRANULOCYTES # BLD AUTO: 0.03 10*3/MM3 (ref 0–0.05)
IMM GRANULOCYTES NFR BLD AUTO: 0.5 % (ref 0–0.5)
LDLC SERPL CALC-MCNC: 105 MG/DL (ref 0–100)
LIPASE SERPL-CCNC: 27 U/L (ref 13–60)
LYMPHOCYTES # BLD AUTO: 0.97 10*3/MM3 (ref 0.7–3.1)
LYMPHOCYTES NFR BLD AUTO: 16.2 % (ref 19.6–45.3)
MCH RBC QN AUTO: 30.3 PG (ref 26.6–33)
MCHC RBC AUTO-ENTMCNC: 33.1 G/DL (ref 31.5–35.7)
MCV RBC AUTO: 91.8 FL (ref 79–97)
MONOCYTES # BLD AUTO: 0.48 10*3/MM3 (ref 0.1–0.9)
MONOCYTES NFR BLD AUTO: 8 % (ref 5–12)
NEUTROPHILS # BLD AUTO: 4.26 10*3/MM3 (ref 1.7–7)
NEUTROPHILS NFR BLD AUTO: 71.1 % (ref 42.7–76)
NRBC BLD AUTO-RTO: 0 /100 WBC (ref 0–0.2)
PLATELET # BLD AUTO: 249 10*3/MM3 (ref 140–450)
POTASSIUM SERPL-SCNC: 5 MMOL/L (ref 3.5–5.2)
PROT SERPL-MCNC: 5.9 G/DL (ref 6–8.5)
RBC # BLD AUTO: 3.89 10*6/MM3 (ref 3.77–5.28)
SODIUM SERPL-SCNC: 139 MMOL/L (ref 136–145)
TRIGL SERPL-MCNC: 200 MG/DL (ref 0–150)
VLDLC SERPL CALC-MCNC: 35 MG/DL (ref 5–40)
WBC # BLD AUTO: 5.99 10*3/MM3 (ref 3.4–10.8)

## 2024-11-18 LAB — INR PPP: 2.4 (ref 2–3)

## 2024-11-19 ENCOUNTER — ANTICOAGULATION VISIT (OUTPATIENT)
Dept: INTERNAL MEDICINE | Facility: CLINIC | Age: 84
End: 2024-11-19
Payer: MEDICARE

## 2024-11-20 ENCOUNTER — ANTICOAGULATION VISIT (OUTPATIENT)
Dept: INTERNAL MEDICINE | Facility: CLINIC | Age: 84
End: 2024-11-20
Payer: MEDICARE

## 2024-11-26 LAB — INR PPP: 1.7 (ref 2–3)

## 2024-11-27 ENCOUNTER — ANTICOAGULATION VISIT (OUTPATIENT)
Dept: INTERNAL MEDICINE | Facility: CLINIC | Age: 84
End: 2024-11-27
Payer: MEDICARE

## 2024-11-27 NOTE — PROGRESS NOTES
"Chief Complaint  Follow-up (3 mo /INR 2.4 at home this morning )    Subjective        Linda Martel presents to [facility]  History of Present Illness  Here for follow up   Taking meds as prescribed  Some generalized abdominal pain over last couple of days seems to be improving  No nausea/vomiting/diarrhea  Fluid status about at her baseline    Objective   Vital Signs:  /88   Pulse 89   Ht 157 cm (61.81\")   Wt 101 kg (222 lb 9.6 oz)   SpO2 96%   BMI 40.96 kg/m²   Estimated body mass index is 40.96 kg/m² as calculated from the following:    Height as of this encounter: 157 cm (61.81\").    Weight as of this encounter: 101 kg (222 lb 9.6 oz).    Physical Exam  Vitals and nursing note reviewed.   Constitutional:       General: She is not in acute distress.     Appearance: Normal appearance.   HENT:      Head: Normocephalic and atraumatic.      Right Ear: External ear normal.      Left Ear: External ear normal.      Nose: Nose normal. No congestion.      Mouth/Throat:      Mouth: Mucous membranes are moist.      Pharynx: No oropharyngeal exudate or posterior oropharyngeal erythema.   Eyes:      Extraocular Movements: Extraocular movements intact.      Conjunctiva/sclera: Conjunctivae normal.      Pupils: Pupils are equal, round, and reactive to light.   Cardiovascular:      Rate and Rhythm: Normal rate and regular rhythm.      Pulses: Normal pulses.      Heart sounds: Normal heart sounds. No murmur heard.  Pulmonary:      Effort: Pulmonary effort is normal. No respiratory distress.      Breath sounds: Normal breath sounds. No wheezing or rales.   Musculoskeletal:      Cervical back: Normal range of motion.   Skin:     General: Skin is warm.      Capillary Refill: Capillary refill takes less than 2 seconds.   Neurological:      General: No focal deficit present.      Mental Status: She is alert and oriented to person, place, and time. Mental status is at " baseline.      Cranial Nerves: No cranial nerve deficit.   Psychiatric:         Mood and Affect: Mood normal.         Behavior: Behavior normal.         Thought Content: Thought content normal.        Result Review :  The following data was reviewed by: Alejandro Hall MD on 11/15/2024:  Common labs          5/1/2024    12:06 8/16/2024    11:15 11/15/2024    14:00   Common Labs   Glucose 106  84  85    BUN 32  26  36    Creatinine 1.33  1.55  1.72    Sodium 139  144  139    Potassium 4.4  4.9  5.0    Chloride 98  97  94    Calcium 9.4  10.2  9.3    Total Protein 6.5  6.7  5.9    Albumin 4.3  4.4  3.9    Total Bilirubin 1.0  1.3  0.8    Alkaline Phosphatase 117  94  80    AST (SGOT) 13  16  14    ALT (SGPT) 10  11  9    WBC 6.2  7.1  5.99    Hemoglobin 14.4  15.3  11.8    Hematocrit 45.1  48.4  35.7    Platelets 218  280  249    Total Cholesterol 197  213  177    Triglycerides 293  264  200    HDL Cholesterol 38  38  37    LDL Cholesterol  109  128  105    Hemoglobin A1C 6.2  5.8  5.70    Microalbumin, Urine CANCELED        Data reviewed : prior office notes reviewed           Assessment and Plan   Diagnoses and all orders for this visit:    1. Type 2 diabetes mellitus with hyperglycemia, without long-term current use of insulin (Primary)  -     Hemoglobin A1c    2. Essential hypertension  -     Comprehensive metabolic panel    3. HFrEF (heart failure with reduced ejection fraction)  -     Comprehensive metabolic panel    4. Left lower quadrant abdominal pain  -     CBC w AUTO Differential  -     Comprehensive metabolic panel  -     Lipase    5. Hyperlipidemia, unspecified hyperlipidemia type  -     Lipid panel    Check labs as above for ongoing monitoring, adjust pending labs results  Abd pain - improving, check labs as above, may need imaging pending progression of symptoms, no clear etiology from history and exam        I spent 15 minutes caring for Linda on this date of service. This time includes time spent by me in  the following activities:preparing for the visit, reviewing tests, obtaining and/or reviewing a separately obtained history, performing a medically appropriate examination and/or evaluation , counseling and educating the patient/family/caregiver, ordering medications, tests, or procedures, and documenting information in the medical record  Follow Up   f/u 3 months  Patient was given instructions and counseling regarding her condition or for health maintenance advice. Please see specific information pulled into the AVS if appropriate.       Alejandro Hall MD  [facility] [place] [facility]  [address] [place] [address]  Phone: [phone]

## 2024-12-06 DIAGNOSIS — I48.21 PERMANENT ATRIAL FIBRILLATION: ICD-10-CM

## 2024-12-06 NOTE — TELEPHONE ENCOUNTER
Rx Refill Note  Requested Prescriptions     Pending Prescriptions Disp Refills    warfarin (COUMADIN) 2.5 MG tablet 90 tablet 3     Si mg 2 days , 2.5 all other days      Last office visit with prescribing clinician: 11/15/2024   Last telemedicine visit with prescribing clinician: Visit date not found   Next office visit with prescribing clinician: 2025                         Would you like a call back once the refill request has been completed: [] Yes [] No    If the office needs to give you a call back, can they leave a voicemail: [] Yes [] No    Cady Deluca MA  24, 15:41 EST

## 2024-12-09 LAB — INR PPP: 2.8 (ref 2–3)

## 2024-12-09 RX ORDER — WARFARIN SODIUM 2.5 MG/1
TABLET ORAL
Qty: 90 TABLET | Refills: 3 | Status: SHIPPED | OUTPATIENT
Start: 2024-12-09

## 2024-12-11 ENCOUNTER — ANTICOAGULATION VISIT (OUTPATIENT)
Dept: INTERNAL MEDICINE | Facility: CLINIC | Age: 84
End: 2024-12-11
Payer: MEDICARE

## 2024-12-26 LAB — INR PPP: 3 (ref 2–3)

## 2024-12-31 ENCOUNTER — ANTICOAGULATION VISIT (OUTPATIENT)
Dept: INTERNAL MEDICINE | Facility: CLINIC | Age: 84
End: 2024-12-31
Payer: MEDICARE

## 2025-01-09 ENCOUNTER — ANTICOAGULATION VISIT (OUTPATIENT)
Dept: INTERNAL MEDICINE | Facility: CLINIC | Age: 85
End: 2025-01-09
Payer: MEDICARE

## 2025-01-09 LAB — INR PPP: 2.1 (ref 2–3)

## 2025-01-17 ENCOUNTER — OFFICE VISIT (OUTPATIENT)
Dept: CARDIOLOGY | Facility: CLINIC | Age: 85
End: 2025-01-17
Payer: MEDICARE

## 2025-01-17 VITALS
WEIGHT: 213 LBS | HEART RATE: 99 BPM | SYSTOLIC BLOOD PRESSURE: 142 MMHG | HEIGHT: 62 IN | DIASTOLIC BLOOD PRESSURE: 90 MMHG | BODY MASS INDEX: 39.2 KG/M2

## 2025-01-17 DIAGNOSIS — I48.21 PERMANENT ATRIAL FIBRILLATION: ICD-10-CM

## 2025-01-17 DIAGNOSIS — I50.812 CHRONIC RIGHT-SIDED HEART FAILURE: ICD-10-CM

## 2025-01-17 DIAGNOSIS — E66.01 CLASS 2 SEVERE OBESITY DUE TO EXCESS CALORIES WITH SERIOUS COMORBIDITY AND BODY MASS INDEX (BMI) OF 38.0 TO 38.9 IN ADULT: ICD-10-CM

## 2025-01-17 DIAGNOSIS — I10 ESSENTIAL HYPERTENSION: ICD-10-CM

## 2025-01-17 DIAGNOSIS — E11.65 TYPE 2 DIABETES MELLITUS WITH HYPERGLYCEMIA, WITHOUT LONG-TERM CURRENT USE OF INSULIN: ICD-10-CM

## 2025-01-17 DIAGNOSIS — E78.2 MIXED HYPERLIPIDEMIA: ICD-10-CM

## 2025-01-17 DIAGNOSIS — I50.32 CHRONIC HEART FAILURE WITH PRESERVED EJECTION FRACTION (HFPEF): Primary | ICD-10-CM

## 2025-01-17 DIAGNOSIS — I27.20 PULMONARY HYPERTENSION: ICD-10-CM

## 2025-01-17 DIAGNOSIS — E66.812 CLASS 2 SEVERE OBESITY DUE TO EXCESS CALORIES WITH SERIOUS COMORBIDITY AND BODY MASS INDEX (BMI) OF 38.0 TO 38.9 IN ADULT: ICD-10-CM

## 2025-01-17 NOTE — PROGRESS NOTES
Date of Office Visit: 2025  Encounter Provider: Luh Mejia MD  Place of Service: [facility] CARDIOLOGY  Patient Name: Linda Martel  :1940      Patient ID:  Linda Martel is a 84 y.o. female is here for  followup for HFpEF        History of Present Illness    She has a history of persistent atrial fibrillation, diabetes mellitus type 2, pulmonary hypertension, HFpEF, JUANITA on CPAP, obesity, h/o GI bleeding with combination of nonsteroidal anti-inflammatories and anticoagulation, hyperlipidemia, Crohn's disease.     She moved to this area from [place] because her daughter, [place], who is a friend of mine lives here and she wanted to be closer to her daughter and her grandchildren.  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      In 2017, she related that she had had some chest pressure and short-windedness.  We set her up for a stress nuclear perfusion study, which was done on 2016, and this showed a small- to medium-sized area of mild ischemia in the anterior wall.  Ejection fraction was normal at 62%.  She had an echocardiogram done as well, which showed ejection fraction of 55% with mild to moderate left atrial enlargement, mild to moderate mitral insufficiency, mildly reduced left ventricular systolic function, mild aortic insufficiency, and moderate pulmonary hypertension. When I spoke with her on the phone after the results, we decided that she was fairly low risk and she was not having any further chest pain or pressure and so we were going to treat things medically at that point.  She had a cardiac catheterization which was done 2017.  This showed normal coronary arteries with a right dominant coronary artery.  Left ventricle systolic function was normal.  Her LVEDP was also normal.    She presented to [facility] in 2018 for acute respiratory failure,  secondary pulmonary hypertension, and acute on chronic diastolic congestive heart failure.  Echocardiogram performed at that time showed ejection fraction of 57% with mild LVH, moderate OSVALDO and LAE and severely elevated RVSP.  She was treated with IV diuresis-lost 9 L.       She had a right heart catheterization 6/2018 showing right atrial pressure 10 mmHg, right ventricular pressure 51/11 mmHg, pulmonary artery pressure 52/22 with a mean of 29 mmHg, and a wedge pressure 18 mm artery, cardiac index 2.3 L/m/m².  Her left ventricular end-diastolic pressure was 12 mmHg.  Her pulmonary pressures did not change with adenosine challenge.  This is consistent with moderate pulmonary hypertension.     She was admitted with worsening congestive heart failure and uncontrolled atrial fibrillation on 7/2/2020.  She was diuresed with IV Lasix.  Echo done 7/5/2020 showed ejection fraction 27% with global hypokinesis, severe right ventricular dilation was severely reduced right ventricular systolic function and RVSP greater than 55 mmHg.  Her right heart catheterization showed pulmonary wedge pressure 27 with a pulmonary artery pressure of 59/17, mean of 39, right ventricular pressure, 56/8 right atrial pressure of 12.  Her cardiac index is 1.51 L/min/m² with a cardiac output of 2.92 L/min calculated by Brittany.  There was no significant coronary artery disease. Her medication regimen was changed to also include Entresto.  She was released with hospice care at home.  On admission to the hospital, she was not using her CPAP for sleep apnea.    Echo done 11/30/2021 ejection fraction 46-50% with mild global hypokinesis, mild aortic stenosis, no other significant abnormalities.      She was admitted for 24-hour observation 5/2023 with decompensation of her chronic HFpEF-she received IV diuretics.Labs showed at bedtime troponin 27, proBNP 1268, creatinine 1.51, glucose 103, otherwise normal CMP, normal CBC, INR 2.63.  Echocardiogram done  5/20/2023 showed ejection fraction of 46-50% with mildly reduced right ventricular systolic function, severe biatrial enlargement, mild aortic stenosis, otherwise no significant valvular abnormalities.  She did notice that before this decompensation, her blood pressure was running low, she was having fatigue.  She then began having more short windedness and hypoxia.     She went off spironolactone due to low blood pressure.    Echocardiogram done 6/14/2024 shows ejection fraction 52% with mild concentric left ventricular hypertrophy, mild left atrial enlargement, moderate right atrial enlargement, RVSP 49 mmHg.  Labs on 11/15/2024 showed creatinine 1.72, otherwise unremarkable CMP, total cholesterol 177, HDL 37, , triglycerides 200, VLDL 35, hemoglobin 11.8 with otherwise normal CBC.    She is overall feeling well.  She occasionally gets some fluid in her legs and takes an extra Bumex and it resolves it.  Her weight has been stable.  She checks her blood pressure every morning and runs 118-130/70-80.  Sometimes she feels off balance but she has had no dizziness or near syncope.  She does not feel her heart racing or skipping.  She is taking her medications as directed without difficulty.  Her appetite is stable.  Her energy level stable.  She has had no fevers, chills, cough, diarrhea.  Occasionally she gets a vomiting.    Past Medical History:   Diagnosis Date    Atrial fibrillation     CHF (congestive heart failure)     Diabetes mellitus     Hyperlipidemia     Hypertension     IBS (irritable bowel syndrome)     Joint infection 2020    PROPHALITIC ANTIBX    JUANITA (obstructive sleep apnea)     Pulmonary hypertension          Past Surgical History:   Procedure Laterality Date    APPENDECTOMY      BACK SURGERY      CARDIAC CATHETERIZATION N/A 06/01/2017    Procedure: Coronary angiography;  Surgeon: Jacques Stern MD;  Location: [facility] CATH INVASIVE LOCATION;  Service:     CARDIAC CATHETERIZATION N/A 06/06/2018     Procedure: Right Heart Cath with vasodilator challenge  Dr. elliott to perform;  Surgeon: Jacques Elliott MD;  Location:  [facility] CATH INVASIVE LOCATION;  Service: Cardiovascular    CARDIAC CATHETERIZATION N/A 06/06/2018    Procedure: Left Heart Cath;  Surgeon: Jacques Elliott MD;  Location:  [facility] CATH INVASIVE LOCATION;  Service: Cardiovascular    CARDIAC CATHETERIZATION N/A 07/08/2020    Procedure: Right and Left Heart Cath;  Surgeon: Saima Andrade MD;  Location:  [facility] CATH INVASIVE LOCATION;  Service: Cardiovascular;  Laterality: N/A;  cors      CARDIAC CATHETERIZATION N/A 07/08/2020    Procedure: Coronary angiography;  Surgeon: Saima Andrade MD;  Location:  [facility] CATH INVASIVE LOCATION;  Service: Cardiovascular;  Laterality: N/A;    COLONOSCOPY      EYE SURGERY Bilateral     CATARACTS    GALLBLADDER SURGERY      HIP SURGERY Left     KNEE SURGERY Bilateral     TONSILLECTOMY      TOTAL HIP ARTHROPLASTY Right 5/2/2022    Procedure: TOTAL HIP ARTHROPLASTY ANTERIOR WITH HANA TABLE;  Surgeon: Gio Mays II, MD;  Location: [facility] MAIN OR;  Service: Orthopedics;  Laterality: Right;    TOTAL KNEE ARTHROPLASTY Left 09/01/2019    Procedure: TOTAL KNEE ARTHROPLASTY, I&D with liner exchange;  Surgeon: Gio Mays II, MD;  Location: [facility] MAIN OR;  Service: Orthopedics       Current Outpatient Medications on File Prior to Visit   Medication Sig Dispense Refill    bumetanide (BUMEX) 2 MG tablet Take 2 tablets by mouth 2 (Two) Times a Day. 360 tablet 3    Clotrimazole 1 % ointment Apply 1 application  topically 2 (Two) Times a Day. 56.7 each 3    clotrimazole-betamethasone (LOTRISONE) 1-0.05 % cream Apply 1 Application topically to the appropriate area as directed 2 (Two) Times a Day. 45 g 1    dilTIAZem CD (CARDIZEM CD) 120 MG 24 hr capsule Take 1 capsule by mouth Daily. 90 capsule 3    doxycycline (VIBRAMYCIN) 100 MG capsule Take 1 capsule by mouth 2 (Two) Times a Day. 180 capsule 3     ferrous sulfate 325 (65 FE) MG tablet 1 tab PO daily Monday, Wednesday, Friday 90 tablet 2    fluconazole (DIFLUCAN) 150 MG tablet Take 2 tablets by mouth 1 (One) Time Per Week. 8 tablet 1    glucose blood (Accu-Chek Guide) test strip Use as instructed to check blood glucose once a day. 100 each 12    HYDROcodone-acetaminophen (Norco) 5-325 MG per tablet Take 1 tablet by mouth Every 6 (Six) Hours As Needed for Mild Pain . 30 tablet 0    hydrocortisone 2.5 % cream Apply 1 application  topically to the appropriate area as directed 2 (Two) Times a Day. 90 g 3    Insulin Glargine (LANTUS SOLOSTAR) 100 UNIT/ML injection pen Inject 15 Units under the skin into the appropriate area as directed Every Night. 6 mL 6    Insulin Pen Needle 32G X 4 MM misc Use 1 each Daily. 90 each 2    Lancets (OneTouch Delica Plus Umtnvl69S) misc USE TO TEST BLOOD SUGAR 3 TO 4 TIMES DAILY 100 each 12    metFORMIN (GLUCOPHAGE) 500 MG tablet Take 1 tablet by mouth 2 (Two) Times a Day With Meals. 180 tablet 3    metoprolol succinate XL (TOPROL-XL) 100 MG 24 hr tablet Take 1 tablet by mouth 2 (Two) Times a Day. 180 tablet 3    nitroglycerin (NITROSTAT) 0.4 MG SL tablet Place 1 tablet under the tongue Every 5 (Five) Minutes As Needed for Chest Pain. Take no more than 3 doses in 15 minutes. 30 tablet 1    nystatin (MYCOSTATIN) 216106 UNIT/GM powder Apply  topically to the appropriate area as directed See Admin Instructions. Apply topically to the affected area three times daily as directed 180 g 3    nystatin-triamcinolone (MYCOLOG) 980614-4.1 UNIT/GM-% ointment Apply 1 Application topically to the appropriate area as directed 2 (Two) Times a Day. 180 g 3    omeprazole (priLOSEC) 40 MG capsule Take 1 capsule by mouth As Needed (as needed for stomach upset). 90 capsule 2    Ondansetron HCl (ZOFRAN PO) Take 4 mg by mouth Every 4 (Four) Hours As Needed (as needed for nausea).      potassium chloride (KLOR-CON M10) 10 MEQ CR tablet       Probiotic  Product (PROBIOTIC PO) Take 1 tablet by mouth 2 (Two) Times a Day.      sacubitril-valsartan (ENTRESTO) 49-51 MG tablet Take 1 tablet by mouth 2 (Two) Times a Day. 180 tablet 3    senna (SENOKOT) 8.6 MG tablet Take 2 tablets by mouth At Night As Needed (as needed for constipation). At bedtime      vitamin B-12 (CYANOCOBALAMIN) 1000 MCG tablet TAKE ONE TABLET BY MOUTH DAILY 90 tablet 2    vitamin E 400 UNIT capsule TAKE ONE CAPSULE BY MOUTH DAILY 90 capsule 1    warfarin (Coumadin) 1 MG tablet Alternate every other day with warfarin 2.5mg daily 30 tablet 1    warfarin (COUMADIN) 2.5 MG tablet 5 mg 3 days , 2.5 all other days 90 tablet 3    saccharomyces boulardii (Florastor) 250 MG capsule Take 1 capsule by mouth 2 (Two) Times a Day. Okay to substitute for equivalent lower price (Patient not taking: Reported on 1/17/2025) 60 capsule 2    [DISCONTINUED] prochlorperazine (COMPAZINE) 10 MG tablet TAKE ONE TABLET BY MOUTH EVERY 6 HOURS AS NEEDED FOR NAUSEA AND VOMITING (Patient not taking: Reported on 1/17/2025) 30 tablet 6     No current facility-administered medications on file prior to visit.       Social History     Socioeconomic History    Marital status:    Tobacco Use    Smoking status: Never     Passive exposure: Never    Smokeless tobacco: Never    Tobacco comments:     caffeine use: 1 cup daily.    Vaping Use    Vaping status: Never Used   Substance and Sexual Activity    Alcohol use: Not Currently     Alcohol/week: 1.0 standard drink of alcohol     Types: 1 Glasses of wine per week     Comment: occasional    Drug use: No    Sexual activity: Defer             Procedures    ECG 12 Lead    Date/Time: 1/17/2025 1:16 PM  Performed by: Luh Mejia MD    Authorized by: Luh Mejia MD  Comparison: compared with previous ECG   Similar to previous ECG  Rhythm: atrial fibrillation  Other findings: low voltage and poor R wave progression    Clinical impression: abnormal EKG           "  Objective:      Vitals:    01/17/25 1308   BP: 142/90   Pulse: 99   Weight: 96.6 kg (213 lb)   Height: 157.5 cm (62\")     Body mass index is 38.96 kg/m².    Vitals reviewed.   Constitutional:       General: Not in acute distress.     Appearance: Obese. Not diaphoretic.   Neck:      Vascular: No carotid bruit or JVD.   Pulmonary:      Effort: Pulmonary effort is normal.      Breath sounds: Normal breath sounds.   Cardiovascular:      Normal rate. Irregularly irregular rhythm.      Murmurs: There is no murmur.      No gallop.  No rub.   Pulses:     Intact distal pulses.      Carotid: 2+ bilaterally.     Radial: 2+ bilaterally.     Dorsalis pedis: 2+ bilaterally.     Posterior tibial: 2+ bilaterally.  Edema:     Peripheral edema present.     Ankle: bilateral trace edema of the ankle.  Neurological:      Cranial Nerves: No cranial nerve deficit.         Lab Review:       Assessment:      Diagnosis Plan   1. Chronic heart failure with preserved ejection fraction (HFpEF)        2. Chronic right-sided heart failure        3. Pulmonary hypertension        4. Type 2 diabetes mellitus with hyperglycemia, without long-term current use of insulin        5. Essential hypertension        6. Permanent atrial fibrillation        7. Mixed hyperlipidemia        8. Class 2 severe obesity due to excess calories with serious comorbidity and body mass index (BMI) of 38.0 to 38.9 in adult            Chronic HFpEF, euvolemic today.  Chronic right heart failure, euvolemic today  Permanent atrial fibrillation, on warfarin and rate control.  Her CHADS2 Vascor 6 giving a 9.8% annual risk of stroke.  INR target is 2-3.  Hypertension, well controlled, checks her blood pressure every morning at home and is well-controlled.  JUANITA on CPAP, she is compliant with this  Diabetes mellitus type 2, requiring insulin  Hyperlipidemia, on rosuvastatin  Sedentary with mobility issues, has rods in her back, uses a walker.  Obesity  Mixed etiology pulmonary " hypertension  History of nonischemic cardiomyopathy-resolved  Left knee infection-on chronic antibiotics  CKD, stable  Osteoarthritis     Plan:       No medication changes, see Nisreen in 6 months, no other testing at this time.

## 2025-01-23 DIAGNOSIS — I48.21 PERMANENT ATRIAL FIBRILLATION: ICD-10-CM

## 2025-01-24 RX ORDER — DILTIAZEM HYDROCHLORIDE 120 MG/1
120 CAPSULE, COATED, EXTENDED RELEASE ORAL DAILY
Qty: 90 CAPSULE | Refills: 3 | Status: SHIPPED | OUTPATIENT
Start: 2025-01-24

## 2025-01-28 NOTE — TELEPHONE ENCOUNTER
Rx Refill Note  Requested Prescriptions     Pending Prescriptions Disp Refills    potassium chloride (KLOR-CON M10) 10 MEQ CR tablet [Pharmacy Med Name: POTASSIUM CHLORIDE ER M-10 MEQ TAB] 90 tablet      Sig: TAKE ONE TABLET BY MOUTH ONCE NIGHTLY    doxycycline (VIBRAMYCIN) 100 MG capsule [Pharmacy Med Name: DOXYCYCLINE HYCLATE 100 MG CAP] 180 capsule 3     Sig: TAKE 1 CAPSULE BY MOUTH TWICE A DAY      Last office visit with prescribing clinician: 11/15/2024   Last telemedicine visit with prescribing clinician: Visit date not found   Next office visit with prescribing clinician: 2/14/2025                         Would you like a call back once the refill request has been completed: [] Yes [] No    If the office needs to give you a call back, can they leave a voicemail: [] Yes [] No    Cady Deluca MA  01/28/25, 11:53 EST

## 2025-01-29 RX ORDER — DOXYCYCLINE 100 MG/1
100 CAPSULE ORAL 2 TIMES DAILY
Qty: 180 CAPSULE | Refills: 3 | Status: SHIPPED | OUTPATIENT
Start: 2025-01-29

## 2025-01-29 RX ORDER — POTASSIUM CHLORIDE 750 MG/1
10 TABLET, EXTENDED RELEASE ORAL NIGHTLY
Qty: 90 TABLET | Refills: 1 | Status: SHIPPED | OUTPATIENT
Start: 2025-01-29

## 2025-01-30 ENCOUNTER — ANTICOAGULATION VISIT (OUTPATIENT)
Dept: INTERNAL MEDICINE | Facility: CLINIC | Age: 85
End: 2025-01-30
Payer: MEDICARE

## 2025-01-30 LAB — INR PPP: 2.5 (ref 2–3)

## 2025-01-30 PROCEDURE — 85610 PROTHROMBIN TIME: CPT | Performed by: INTERNAL MEDICINE

## 2025-01-30 PROCEDURE — 36416 COLLJ CAPILLARY BLOOD SPEC: CPT | Performed by: INTERNAL MEDICINE

## 2025-02-11 DIAGNOSIS — E11.65 TYPE 2 DIABETES MELLITUS WITH HYPERGLYCEMIA, WITHOUT LONG-TERM CURRENT USE OF INSULIN: ICD-10-CM

## 2025-02-12 RX ORDER — BLOOD SUGAR DIAGNOSTIC
STRIP MISCELLANEOUS
Qty: 100 EACH | Refills: 5 | Status: SHIPPED | OUTPATIENT
Start: 2025-02-12

## 2025-02-14 ENCOUNTER — OFFICE VISIT (OUTPATIENT)
Dept: INTERNAL MEDICINE | Facility: CLINIC | Age: 85
End: 2025-02-14
Payer: MEDICARE

## 2025-02-14 VITALS
RESPIRATION RATE: 16 BRPM | BODY MASS INDEX: 38.64 KG/M2 | HEIGHT: 62 IN | HEART RATE: 50 BPM | OXYGEN SATURATION: 96 % | SYSTOLIC BLOOD PRESSURE: 120 MMHG | DIASTOLIC BLOOD PRESSURE: 76 MMHG | WEIGHT: 210 LBS

## 2025-02-14 DIAGNOSIS — R94.6 ABNORMAL RESULTS OF THYROID FUNCTION STUDIES: ICD-10-CM

## 2025-02-14 DIAGNOSIS — E11.65 TYPE 2 DIABETES MELLITUS WITH HYPERGLYCEMIA, WITHOUT LONG-TERM CURRENT USE OF INSULIN: Primary | ICD-10-CM

## 2025-02-14 DIAGNOSIS — I27.20 PULMONARY HYPERTENSION: ICD-10-CM

## 2025-02-14 DIAGNOSIS — I48.21 PERMANENT ATRIAL FIBRILLATION: ICD-10-CM

## 2025-02-14 DIAGNOSIS — L30.4 INTERTRIGO: ICD-10-CM

## 2025-02-14 DIAGNOSIS — E78.2 MIXED HYPERLIPIDEMIA: ICD-10-CM

## 2025-02-14 DIAGNOSIS — I10 ESSENTIAL HYPERTENSION: ICD-10-CM

## 2025-02-14 DIAGNOSIS — I50.20 HFREF (HEART FAILURE WITH REDUCED EJECTION FRACTION): ICD-10-CM

## 2025-02-14 LAB — INR PPP: 2 (ref 2–3)

## 2025-02-14 PROCEDURE — 1126F AMNT PAIN NOTED NONE PRSNT: CPT | Performed by: INTERNAL MEDICINE

## 2025-02-14 PROCEDURE — 3078F DIAST BP <80 MM HG: CPT | Performed by: INTERNAL MEDICINE

## 2025-02-14 PROCEDURE — 99214 OFFICE O/P EST MOD 30 MIN: CPT | Performed by: INTERNAL MEDICINE

## 2025-02-14 PROCEDURE — 3074F SYST BP LT 130 MM HG: CPT | Performed by: INTERNAL MEDICINE

## 2025-02-14 RX ORDER — BUMETANIDE 2 MG/1
4 TABLET ORAL 2 TIMES DAILY
Qty: 360 TABLET | Refills: 3 | Status: SHIPPED | OUTPATIENT
Start: 2025-02-14

## 2025-02-14 RX ORDER — CLOTRIMAZOLE AND BETAMETHASONE DIPROPIONATE 10; .64 MG/G; MG/G
1 CREAM TOPICAL 2 TIMES DAILY
Qty: 45 G | Refills: 1 | Status: SHIPPED | OUTPATIENT
Start: 2025-02-14

## 2025-02-15 LAB
ALBUMIN SERPL-MCNC: 4.1 G/DL (ref 3.5–5.2)
ALBUMIN/GLOB SERPL: 2.4 G/DL
ALP SERPL-CCNC: 78 U/L (ref 39–117)
ALT SERPL-CCNC: 8 U/L (ref 1–33)
AST SERPL-CCNC: 12 U/L (ref 1–32)
BASOPHILS # BLD AUTO: 0.02 10*3/MM3 (ref 0–0.2)
BASOPHILS NFR BLD AUTO: 0.4 % (ref 0–1.5)
BILIRUB SERPL-MCNC: 0.9 MG/DL (ref 0–1.2)
BUN SERPL-MCNC: 25 MG/DL (ref 8–23)
BUN/CREAT SERPL: 17.7 (ref 7–25)
CALCIUM SERPL-MCNC: 9.6 MG/DL (ref 8.6–10.5)
CHLORIDE SERPL-SCNC: 99 MMOL/L (ref 98–107)
CHOLEST SERPL-MCNC: 198 MG/DL (ref 0–200)
CO2 SERPL-SCNC: 29.7 MMOL/L (ref 22–29)
CREAT SERPL-MCNC: 1.41 MG/DL (ref 0.57–1)
EGFRCR SERPLBLD CKD-EPI 2021: 37.1 ML/MIN/1.73
EOSINOPHIL # BLD AUTO: 0 10*3/MM3 (ref 0–0.4)
EOSINOPHIL NFR BLD AUTO: 0 % (ref 0.3–6.2)
ERYTHROCYTE [DISTWIDTH] IN BLOOD BY AUTOMATED COUNT: 12.2 % (ref 12.3–15.4)
GLOBULIN SER CALC-MCNC: 1.7 GM/DL
GLUCOSE SERPL-MCNC: 96 MG/DL (ref 65–99)
HBA1C MFR BLD: 6 % (ref 4.8–5.6)
HCT VFR BLD AUTO: 38.8 % (ref 34–46.6)
HDLC SERPL-MCNC: 37 MG/DL (ref 40–60)
HGB BLD-MCNC: 13 G/DL (ref 12–15.9)
IMM GRANULOCYTES # BLD AUTO: 0.02 10*3/MM3 (ref 0–0.05)
IMM GRANULOCYTES NFR BLD AUTO: 0.4 % (ref 0–0.5)
LDLC SERPL CALC-MCNC: 123 MG/DL (ref 0–100)
LYMPHOCYTES # BLD AUTO: 0.6 10*3/MM3 (ref 0.7–3.1)
LYMPHOCYTES NFR BLD AUTO: 11.4 % (ref 19.6–45.3)
MCH RBC QN AUTO: 30.6 PG (ref 26.6–33)
MCHC RBC AUTO-ENTMCNC: 33.5 G/DL (ref 31.5–35.7)
MCV RBC AUTO: 91.3 FL (ref 79–97)
MONOCYTES # BLD AUTO: 0.36 10*3/MM3 (ref 0.1–0.9)
MONOCYTES NFR BLD AUTO: 6.8 % (ref 5–12)
NEUTROPHILS # BLD AUTO: 4.26 10*3/MM3 (ref 1.7–7)
NEUTROPHILS NFR BLD AUTO: 81 % (ref 42.7–76)
NRBC BLD AUTO-RTO: 0 /100 WBC (ref 0–0.2)
PLATELET # BLD AUTO: 230 10*3/MM3 (ref 140–450)
POTASSIUM SERPL-SCNC: 4.7 MMOL/L (ref 3.5–5.2)
PROT SERPL-MCNC: 5.8 G/DL (ref 6–8.5)
RBC # BLD AUTO: 4.25 10*6/MM3 (ref 3.77–5.28)
SODIUM SERPL-SCNC: 139 MMOL/L (ref 136–145)
T4 FREE SERPL-MCNC: 1.23 NG/DL (ref 0.92–1.68)
TRIGL SERPL-MCNC: 217 MG/DL (ref 0–150)
TSH SERPL DL<=0.005 MIU/L-ACNC: 2.36 UIU/ML (ref 0.27–4.2)
VLDLC SERPL CALC-MCNC: 38 MG/DL (ref 5–40)
WBC # BLD AUTO: 5.26 10*3/MM3 (ref 3.4–10.8)

## 2025-02-17 NOTE — PROGRESS NOTES
"Chief Complaint  Follow-up (3 mo f/u/INR was 2.0) and Med Refill (Needs bumex and lotrisone refilled at [facility])    Subjective        Linda Martel presents to [facility]  History of Present Illness  Here for follow up   She reports overall doing pretty well  Does need refill on bumex and lotrisone  INR today was 2.0   She has intermittent flares of the intertrigo but has been managing, declines to see dermatology     Objective   Vital Signs:  /76   Pulse 50   Resp 16   Ht 157.5 cm (62\")   Wt 95.3 kg (210 lb)   SpO2 96%   BMI 38.41 kg/m²   Estimated body mass index is 38.41 kg/m² as calculated from the following:    Height as of this encounter: 157.5 cm (62\").    Weight as of this encounter: 95.3 kg (210 lb).    Physical Exam  Vitals and nursing note reviewed.   Constitutional:       General: She is not in acute distress.     Appearance: Normal appearance.   HENT:      Head: Normocephalic and atraumatic.      Right Ear: External ear normal.      Left Ear: External ear normal.      Nose: Nose normal. No congestion.      Mouth/Throat:      Mouth: Mucous membranes are moist.      Pharynx: No oropharyngeal exudate or posterior oropharyngeal erythema.   Eyes:      Extraocular Movements: Extraocular movements intact.      Conjunctiva/sclera: Conjunctivae normal.      Pupils: Pupils are equal, round, and reactive to light.   Cardiovascular:      Rate and Rhythm: Normal rate and regular rhythm.      Pulses: Normal pulses.      Heart sounds: Normal heart sounds. No murmur heard.  Pulmonary:      Effort: Pulmonary effort is normal. No respiratory distress.      Breath sounds: Normal breath sounds. No wheezing or rales.   Musculoskeletal:      Cervical back: Normal range of motion.   Skin:     General: Skin is warm.      Capillary Refill: Capillary refill takes less than 2 seconds.   Neurological:      General: No focal deficit present.      Mental Status: She is " alert and oriented to person, place, and time. Mental status is at baseline.      Cranial Nerves: No cranial nerve deficit.   Psychiatric:         Mood and Affect: Mood normal.         Behavior: Behavior normal.         Thought Content: Thought content normal.        Result Review :  The following data was reviewed by: Alejandro Hall MD on 02/14/2025:  Common labs          8/16/2024    11:15 11/15/2024    14:00 2/14/2025    11:10   Common Labs   Glucose 84  85  96    BUN 26  36  25    Creatinine 1.55  1.72  1.41    Sodium 144  139  139    Potassium 4.9  5.0  4.7    Chloride 97  94  99    Calcium 10.2  9.3  9.6    Albumin 4.4  3.9  4.1    Total Bilirubin 1.3  0.8  0.9    Alkaline Phosphatase 94  80  78    AST (SGOT) 16  14  12    ALT (SGPT) 11  9  8    WBC 7.1  5.99  5.26    Hemoglobin 15.3  11.8  13.0    Hematocrit 48.4  35.7  38.8    Platelets 280  249  230    Total Cholesterol 213  177  198    Triglycerides 264  200  217    HDL Cholesterol 38  37  37    LDL Cholesterol  128  105  123    Hemoglobin A1C 5.8  5.70  6.00      Data reviewed : prior office notes reviewed           Assessment and Plan   Diagnoses and all orders for this visit:    1. Type 2 diabetes mellitus with hyperglycemia, without long-term current use of insulin (Primary)  -     CBC w AUTO Differential  -     Hemoglobin A1c    2. Permanent atrial fibrillation  -     CBC w AUTO Differential    3. Essential hypertension    4. Mixed hyperlipidemia  -     Lipid panel    5. Pulmonary hypertension  -     Comprehensive metabolic panel  -     TSH  -     T4, free    6. HFrEF (heart failure with reduced ejection fraction)  -     bumetanide (BUMEX) 2 MG tablet; Take 2 tablets by mouth 2 (Two) Times a Day.  Dispense: 360 tablet; Refill: 3    7. Intertrigo  -     clotrimazole-betamethasone (LOTRISONE) 1-0.05 % cream; Apply 1 Application topically to the appropriate area as directed 2 (Two) Times a Day.  Dispense: 45 g; Refill: 1  -     Clotrimazole 1 % ointment;  Apply 1 application  topically 2 (Two) Times a Day.  Dispense: 56.7 each; Refill: 3    8. Abnormal results of thyroid function studies  -     TSH  -     T4, free      Check labs fro ongoing monitoring as above, will adjust meds pending lab results  INR in goal range today, continue current warfarin dosing  F/u 3 months     I spent 15 minutes caring for Linda on this date of service. This time includes time spent by me in the following activities:preparing for the visit, reviewing tests, obtaining and/or reviewing a separately obtained history, performing a medically appropriate examination and/or evaluation , counseling and educating the patient/family/caregiver, ordering medications, tests, or procedures, and documenting information in the medical record  Follow Up   F/u 3 months  Patient was given instructions and counseling regarding her condition or for health maintenance advice. Please see specific information pulled into the AVS if appropriate.     Alejandro Hall MD  [facility]  [facility] [address]  Phone: [phone]

## 2025-02-19 ENCOUNTER — TELEPHONE (OUTPATIENT)
Dept: INTERNAL MEDICINE | Facility: CLINIC | Age: 85
End: 2025-02-19
Payer: MEDICARE

## 2025-02-19 NOTE — TELEPHONE ENCOUNTER
CATHLEEN RASMUSSEN FOR ALEVAZOL SCANNED TO CHART.   Implemented All Universal Safety Interventions:  [place] to call system. Call bell, personal items and telephone within reach. Instruct patient to call for assistance. Room bathroom lighting operational. Non-slip footwear when patient is off stretcher. Physically safe environment: no spills, clutter or unnecessary equipment. Stretcher in lowest position, wheels locked, appropriate side rails in place.

## 2025-02-20 ENCOUNTER — TELEPHONE (OUTPATIENT)
Dept: INTERNAL MEDICINE | Facility: CLINIC | Age: 85
End: 2025-02-20
Payer: MEDICARE

## 2025-02-20 NOTE — TELEPHONE ENCOUNTER
I submitted a PA for Linda's Alevazol 1% ointment and this is the message I get from CoverMyMeds :  You asked for an over-the-counter (OTC) drug. The Medicare rule in the Prescription Drug Benefit Manual (Chapter 6, Section 10.10) says OTC drugs are excluded from Part D coverage. Per Medicare rules, this drug cannot be covered. (Click CANCEL or DELETE if this coverage request is no longer needed.)    Thank you

## 2025-02-28 LAB — INR PPP: 3 (ref 2–3)

## 2025-03-03 ENCOUNTER — ANTICOAGULATION VISIT (OUTPATIENT)
Dept: INTERNAL MEDICINE | Facility: CLINIC | Age: 85
End: 2025-03-03
Payer: MEDICARE

## 2025-03-05 DIAGNOSIS — I10 ESSENTIAL HYPERTENSION: ICD-10-CM

## 2025-03-05 DIAGNOSIS — I27.20 PULMONARY HYPERTENSION: ICD-10-CM

## 2025-03-05 RX ORDER — SACUBITRIL AND VALSARTAN 49; 51 MG/1; MG/1
1 TABLET, FILM COATED ORAL 2 TIMES DAILY
Qty: 180 TABLET | Refills: 3 | Status: SHIPPED | OUTPATIENT
Start: 2025-03-05

## 2025-03-11 DIAGNOSIS — I48.21 PERMANENT ATRIAL FIBRILLATION: ICD-10-CM

## 2025-03-11 DIAGNOSIS — I10 ESSENTIAL HYPERTENSION: ICD-10-CM

## 2025-03-11 RX ORDER — METOPROLOL SUCCINATE 100 MG/1
100 TABLET, EXTENDED RELEASE ORAL 2 TIMES DAILY
Qty: 180 TABLET | Refills: 3 | Status: SHIPPED | OUTPATIENT
Start: 2025-03-11

## 2025-03-17 LAB — INR PPP: 3.2 (ref 2–3)

## 2025-03-19 ENCOUNTER — ANTICOAGULATION VISIT (OUTPATIENT)
Dept: INTERNAL MEDICINE | Facility: CLINIC | Age: 85
End: 2025-03-19
Payer: MEDICARE

## 2025-03-26 ENCOUNTER — ANTICOAGULATION VISIT (OUTPATIENT)
Dept: INTERNAL MEDICINE | Facility: CLINIC | Age: 85
End: 2025-03-26
Payer: MEDICARE

## 2025-03-26 LAB — INR PPP: 2.1 (ref 2–3)

## 2025-03-31 RX ORDER — CLOTRIMAZOLE 1 %
1 CREAM (GRAM) TOPICAL 2 TIMES DAILY
Qty: 60 G | Refills: 5 | Status: SHIPPED | OUTPATIENT
Start: 2025-03-31

## 2025-04-07 LAB — INR PPP: 2.6 (ref 2–3)

## 2025-04-08 ENCOUNTER — ANTICOAGULATION VISIT (OUTPATIENT)
Dept: INTERNAL MEDICINE | Facility: CLINIC | Age: 85
End: 2025-04-08
Payer: MEDICARE

## 2025-04-10 NOTE — TELEPHONE ENCOUNTER
Caller: Linda Martel    Relationship: Self    Best call back number: [phone]     Requested Prescriptions:   Requested Prescriptions     Pending Prescriptions Disp Refills    Insulin Glargine (LANTUS SOLOSTAR) 100 UNIT/ML injection pen 6 mL 6     Sig: Inject 15 Units under the skin into the appropriate area as directed Every Night.        Pharmacy where request should be sent: [id number] [id number] [facility] [phone] [facility] [phone] FX     Last office visit with prescribing clinician: 2/14/2025   Last telemedicine visit with prescribing clinician: Visit date not found   Next office visit with prescribing clinician: 5/16/2025       Does the patient have less than a 3 day supply:  [x] Yes  [] No

## 2025-04-17 LAB — INR PPP: 2.8 (ref 2–3)

## 2025-04-21 DIAGNOSIS — E11.65 TYPE 2 DIABETES MELLITUS WITH HYPERGLYCEMIA, WITHOUT LONG-TERM CURRENT USE OF INSULIN: ICD-10-CM

## 2025-04-23 ENCOUNTER — ANTICOAGULATION VISIT (OUTPATIENT)
Dept: INTERNAL MEDICINE | Facility: CLINIC | Age: 85
End: 2025-04-23
Payer: MEDICARE

## 2025-05-13 LAB — INR PPP: 2.7 (ref 2–3)

## 2025-05-14 ENCOUNTER — ANTICOAGULATION VISIT (OUTPATIENT)
Dept: INTERNAL MEDICINE | Facility: CLINIC | Age: 85
End: 2025-05-14
Payer: MEDICARE

## 2025-05-16 ENCOUNTER — HOSPITAL ENCOUNTER (OUTPATIENT)
Dept: ULTRASOUND IMAGING | Facility: HOSPITAL | Age: 85
Discharge: HOME | End: 2025-05-16
Payer: MEDICARE

## 2025-05-16 ENCOUNTER — OFFICE VISIT (OUTPATIENT)
Dept: INTERNAL MEDICINE | Facility: CLINIC | Age: 85
End: 2025-05-16
Payer: MEDICARE

## 2025-05-16 VITALS
RESPIRATION RATE: 16 BRPM | SYSTOLIC BLOOD PRESSURE: 124 MMHG | HEART RATE: 95 BPM | HEIGHT: 62 IN | OXYGEN SATURATION: 97 % | BODY MASS INDEX: 39.53 KG/M2 | DIASTOLIC BLOOD PRESSURE: 78 MMHG | WEIGHT: 214.8 LBS

## 2025-05-16 DIAGNOSIS — I10 ESSENTIAL HYPERTENSION: ICD-10-CM

## 2025-05-16 DIAGNOSIS — M79.89 LEG SWELLING: ICD-10-CM

## 2025-05-16 DIAGNOSIS — I27.20 PULMONARY HYPERTENSION: ICD-10-CM

## 2025-05-16 DIAGNOSIS — L30.4 INTERTRIGO: ICD-10-CM

## 2025-05-16 DIAGNOSIS — I50.20 HFREF (HEART FAILURE WITH REDUCED EJECTION FRACTION): ICD-10-CM

## 2025-05-16 DIAGNOSIS — E78.2 MIXED HYPERLIPIDEMIA: ICD-10-CM

## 2025-05-16 DIAGNOSIS — E11.65 TYPE 2 DIABETES MELLITUS WITH HYPERGLYCEMIA, WITHOUT LONG-TERM CURRENT USE OF INSULIN: Primary | ICD-10-CM

## 2025-05-16 PROCEDURE — 93971 EXTREMITY STUDY: CPT

## 2025-05-16 PROCEDURE — 3074F SYST BP LT 130 MM HG: CPT | Performed by: INTERNAL MEDICINE

## 2025-05-16 PROCEDURE — 1126F AMNT PAIN NOTED NONE PRSNT: CPT | Performed by: INTERNAL MEDICINE

## 2025-05-16 PROCEDURE — 3078F DIAST BP <80 MM HG: CPT | Performed by: INTERNAL MEDICINE

## 2025-05-16 PROCEDURE — 99214 OFFICE O/P EST MOD 30 MIN: CPT | Performed by: INTERNAL MEDICINE

## 2025-05-16 RX ORDER — CLINDAMYCIN HYDROCHLORIDE 300 MG/1
CAPSULE ORAL
COMMUNITY
Start: 2025-05-08

## 2025-05-16 RX ORDER — NYSTATIN 100000 [USP'U]/G
POWDER TOPICAL SEE ADMIN INSTRUCTIONS
Qty: 180 G | Refills: 3 | Status: SHIPPED | OUTPATIENT
Start: 2025-05-16

## 2025-05-17 LAB
ALBUMIN SERPL-MCNC: 4.2 G/DL (ref 3.7–4.7)
ALP SERPL-CCNC: 82 IU/L (ref 44–121)
ALT SERPL-CCNC: 9 IU/L (ref 0–32)
AST SERPL-CCNC: 16 IU/L (ref 0–40)
BASOPHILS # BLD AUTO: 0 X10E3/UL (ref 0–0.2)
BASOPHILS NFR BLD AUTO: 1 %
BILIRUB SERPL-MCNC: 0.8 MG/DL (ref 0–1.2)
BNP SERPL-MCNC: 302.9 PG/ML (ref 0–100)
BUN SERPL-MCNC: 31 MG/DL (ref 8–27)
BUN/CREAT SERPL: 21 (ref 12–28)
CALCIUM SERPL-MCNC: 9.3 MG/DL (ref 8.7–10.3)
CHLORIDE SERPL-SCNC: 97 MMOL/L (ref 96–106)
CHOLEST SERPL-MCNC: 211 MG/DL (ref 100–199)
CO2 SERPL-SCNC: 25 MMOL/L (ref 20–29)
CREAT SERPL-MCNC: 1.46 MG/DL (ref 0.57–1)
EGFRCR SERPLBLD CKD-EPI 2021: 35 ML/MIN/1.73
EOSINOPHIL # BLD AUTO: 0 X10E3/UL (ref 0–0.4)
EOSINOPHIL NFR BLD AUTO: 0 %
ERYTHROCYTE [DISTWIDTH] IN BLOOD BY AUTOMATED COUNT: 13.2 % (ref 11.7–15.4)
GLOBULIN SER CALC-MCNC: 1.8 G/DL (ref 1.5–4.5)
GLUCOSE SERPL-MCNC: 85 MG/DL (ref 70–99)
HBA1C MFR BLD: 5.8 % (ref 4.8–5.6)
HCT VFR BLD AUTO: 40.4 % (ref 34–46.6)
HDLC SERPL-MCNC: 41 MG/DL
HGB BLD-MCNC: 12.7 G/DL (ref 11.1–15.9)
IMM GRANULOCYTES # BLD AUTO: 0 X10E3/UL (ref 0–0.1)
IMM GRANULOCYTES NFR BLD AUTO: 0 %
LDLC SERPL CALC-MCNC: 121 MG/DL (ref 0–99)
LYMPHOCYTES # BLD AUTO: 0.9 X10E3/UL (ref 0.7–3.1)
LYMPHOCYTES NFR BLD AUTO: 17 %
MCH RBC QN AUTO: 30 PG (ref 26.6–33)
MCHC RBC AUTO-ENTMCNC: 31.4 G/DL (ref 31.5–35.7)
MCV RBC AUTO: 95 FL (ref 79–97)
MONOCYTES # BLD AUTO: 0.4 X10E3/UL (ref 0.1–0.9)
MONOCYTES NFR BLD AUTO: 8 %
NEUTROPHILS # BLD AUTO: 3.8 X10E3/UL (ref 1.4–7)
NEUTROPHILS NFR BLD AUTO: 74 %
PLATELET # BLD AUTO: 250 X10E3/UL (ref 150–450)
POTASSIUM SERPL-SCNC: 4.7 MMOL/L (ref 3.5–5.2)
PROT SERPL-MCNC: 6 G/DL (ref 6–8.5)
RBC # BLD AUTO: 4.24 X10E6/UL (ref 3.77–5.28)
SODIUM SERPL-SCNC: 140 MMOL/L (ref 134–144)
TRIGL SERPL-MCNC: 282 MG/DL (ref 0–149)
VLDLC SERPL CALC-MCNC: 49 MG/DL (ref 5–40)
WBC # BLD AUTO: 5.1 X10E3/UL (ref 3.4–10.8)

## 2025-05-19 ENCOUNTER — TELEPHONE (OUTPATIENT)
Dept: INTERNAL MEDICINE | Facility: CLINIC | Age: 85
End: 2025-05-19
Payer: MEDICARE

## 2025-05-19 NOTE — PROGRESS NOTES
"Chief Complaint  Follow-up (3 mo f/u)    Subjective        Linda Martel presents to [facility]  History of Present Illness  Here for follow up   Doing well overall  She did have some stomach issues but that seems back to normal now  LE swelling, chronic, no worsening shortness of breath or fluid overload, close to her dry weight  She does have some pain posterior right calf, seems a little better now, she is anticoagulated with warfarin     Objective   Vital Signs:  /78   Pulse 95   Resp 16   Ht 157.5 cm (62\")   Wt 97.4 kg (214 lb 12.8 oz)   SpO2 97%   BMI 39.29 kg/m²   Estimated body mass index is 39.29 kg/m² as calculated from the following:    Height as of this encounter: 157.5 cm (62\").    Weight as of this encounter: 97.4 kg (214 lb 12.8 oz).    Physical Exam  Vitals and nursing note reviewed.   Constitutional:       General: She is not in acute distress.     Appearance: Normal appearance.   HENT:      Head: Normocephalic and atraumatic.      Right Ear: External ear normal.      Left Ear: External ear normal.      Nose: Nose normal. No congestion.      Mouth/Throat:      Mouth: Mucous membranes are moist.      Pharynx: No oropharyngeal exudate or posterior oropharyngeal erythema.   Eyes:      Extraocular Movements: Extraocular movements intact.      Conjunctiva/sclera: Conjunctivae normal.      Pupils: Pupils are equal, round, and reactive to light.   Cardiovascular:      Rate and Rhythm: Normal rate and regular rhythm.      Pulses: Normal pulses.      Heart sounds: Normal heart sounds. No murmur heard.  Pulmonary:      Effort: Pulmonary effort is normal. No respiratory distress.      Breath sounds: Normal breath sounds. No wheezing or rales.   Abdominal:      General: Abdomen is flat. Bowel sounds are normal. There is no distension.      Palpations: Abdomen is soft.      Tenderness: There is no abdominal tenderness.   Musculoskeletal:      " Cervical back: Normal range of motion.      Right lower leg: Edema present.      Left lower leg: Edema present.      Comments: Mild redness, no warmth, no significant pain noted on calf but reported this morning     Skin:     General: Skin is warm.      Capillary Refill: Capillary refill takes less than 2 seconds.   Neurological:      General: No focal deficit present.      Mental Status: She is alert and oriented to person, place, and time. Mental status is at baseline.      Cranial Nerves: No cranial nerve deficit.   Psychiatric:         Mood and Affect: Mood normal.         Behavior: Behavior normal.         Thought Content: Thought content normal.        Result Review :  The following data was reviewed by: Alejandro Hall MD on 05/16/2025:  Common labs          11/15/2024    14:00 2/14/2025    11:10 5/16/2025    13:59   Common Labs   Glucose 85  96  85    BUN 36  25  31    Creatinine 1.72  1.41  1.46    Sodium 139  139  140    Potassium 5.0  4.7  4.7    Chloride 94  99  97    Calcium 9.3  9.6  9.3    Albumin 3.9  4.1  4.2    Total Bilirubin 0.8  0.9  0.8    Alkaline Phosphatase 80  78  82    AST (SGOT) 14  12  16    ALT (SGPT) 9  8  9    WBC 5.99  5.26  5.1    Hemoglobin 11.8  13.0  12.7    Hematocrit 35.7  38.8  40.4    Platelets 249  230  250    Total Cholesterol 177  198  211    Triglycerides 200  217  282    HDL Cholesterol 37  37  41    LDL Cholesterol  105  123  121    Hemoglobin A1C 5.70  6.00  5.8      Data reviewed : prior office notes reviewed           Assessment and Plan   Diagnoses and all orders for this visit:    1. Type 2 diabetes mellitus with hyperglycemia, without long-term current use of insulin (Primary)  -     Hemoglobin A1c  -     Comprehensive metabolic panel  -     CBC w AUTO Differential    2. Essential hypertension  -     Comprehensive metabolic panel    3. Mixed hyperlipidemia  -     Comprehensive metabolic panel  -     Lipid panel    4. Intertrigo  -     nystatin (MYCOSTATIN) 346326  UNIT/GM powder; Apply  topically to the appropriate area as directed See Admin Instructions. Apply topically to the affected area three times daily as directed  Dispense: 180 g; Refill: 3    5. Leg swelling  -     Duplex Venous Lower Extremity - Right CAR; Future  -     US Venous Doppler Lower Extremity Right (duplex); Future    6. Pulmonary hypertension  -     BNP ([facility] Only)    7. HFrEF (heart failure with reduced ejection fraction)  -     BNP ([facility] Only)    Check labs for ongoing monitoring  Adjust meds pending results   RLE>LLE swelling, appears more venous stasis but did report some unilateral pain, check u/s for DVT, she is anticoagulated already with warfarin so that would be a bit of puzzle if positive however     F/u 3 months    Alejandro Hall MD  [facility]  [address] [place] [address]  Phone: [phone]

## 2025-05-28 ENCOUNTER — ANTICOAGULATION VISIT (OUTPATIENT)
Dept: INTERNAL MEDICINE | Facility: CLINIC | Age: 85
End: 2025-05-28
Payer: MEDICARE

## 2025-05-28 LAB — INR PPP: 2.8 (ref 2–3)

## 2025-06-11 LAB — INR PPP: 3.1 (ref 2–3)

## 2025-06-13 ENCOUNTER — ANTICOAGULATION VISIT (OUTPATIENT)
Dept: INTERNAL MEDICINE | Facility: CLINIC | Age: 85
End: 2025-06-13
Payer: MEDICARE

## 2025-07-01 LAB — INR PPP: 3.7 (ref 2–3)

## 2025-07-03 ENCOUNTER — ANTICOAGULATION VISIT (OUTPATIENT)
Dept: INTERNAL MEDICINE | Facility: CLINIC | Age: 85
End: 2025-07-03
Payer: MEDICARE

## 2025-07-11 ENCOUNTER — OFFICE VISIT (OUTPATIENT)
Dept: CARDIOLOGY | Facility: CLINIC | Age: 85
End: 2025-07-11
Payer: MEDICARE

## 2025-07-11 VITALS
DIASTOLIC BLOOD PRESSURE: 70 MMHG | HEART RATE: 101 BPM | HEIGHT: 62 IN | WEIGHT: 209.7 LBS | BODY MASS INDEX: 38.59 KG/M2 | SYSTOLIC BLOOD PRESSURE: 122 MMHG

## 2025-07-11 DIAGNOSIS — I50.32 CHRONIC HEART FAILURE WITH PRESERVED EJECTION FRACTION (HFPEF): Primary | ICD-10-CM

## 2025-07-11 DIAGNOSIS — I10 ESSENTIAL HYPERTENSION: ICD-10-CM

## 2025-07-11 DIAGNOSIS — I50.812 CHRONIC RIGHT-SIDED HEART FAILURE: ICD-10-CM

## 2025-07-11 DIAGNOSIS — I48.21 PERMANENT ATRIAL FIBRILLATION: ICD-10-CM

## 2025-07-11 NOTE — PROGRESS NOTES
Date of Office Visit: 2025  Encounter Provider: LEYDA Simpson  Place of Service: [facility]  Established cardiologist: Luh Mejia MD  Patient Name: Linda Martel  :1940      Patient ID:  Linda Martel is a 85 y.o. female is here for  followup    With a pertinent medical history of  persistent atrial fibrillation, diabetes mellitus type 2, pulmonary hypertension, HFpEF, JUANITA on CPAP, obesity, h/o GI bleeding with combination of nonsteroidal anti-inflammatories and anticoagulation, hyperlipidemia, Crohn's disease.      She moved to this area from [place] because her daughter, [place] lives here and she wanted to be closer to her daughter and her grandchildren.  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    History of Present Illness  In 2017, she related that she had had some chest pressure and short-windedness.  We set her up for a stress nuclear perfusion study, which was done on 2016, and this showed a small- to medium-sized area of mild ischemia in the anterior wall.  Ejection fraction was normal at 62%.  She had an echocardiogram done as well, which showed ejection fraction of 55% with mild to moderate left atrial enlargement, mild to moderate mitral insufficiency, mildly reduced left ventricular systolic function, mild aortic insufficiency, and moderate pulmonary hypertension.  It was felt at this time she is fairly low risk and was not having any further symptoms so she was treated medically.  She did not end up having a cardiac catheterization in 2017 that showed normal coronaries.    2018 was [facility] at [facility] with acute respiratory failure secondary to pulmonary hypertension and acute on chronic diastolic heart failure.  An echo showed EF 57% mild LVH moderate right and left atrial enlargement and a severely elevated RVSP.  She was IV  diuresed and lost 9 pounds.    2018 a right heart catheterization done showed a right atrial pressure 10 mmHg right ventricular pressure 51 over 11 mmHg pulmonary artery pressure 52/22 with a mean of 29 mmHg and a wedge pressure 18 mmHg cardiac index was 2.3 her LVEDP was 12 mmHg.  Her pulmonary pressures did not change with adenosine challenge and this was consistent with moderate pulmonary hypertension.    2020 admitted with CHF and uncontrolled A-fib.  She was IV diuresed.  Echo 7/5/2020 showed EF 27% with global hypokinesis severe RV dilation severe reduced RV systolic function and RVSP greater than 55 mmHg.  Her right heart catheterization showed pulmonary wedge pressure 27 with a pulmonary artery pressure of 59/17, mean of 39, right ventricular pressure, 56/8 right atrial pressure of 12.  Her cardiac index is 1.51 L/min/m² with a cardiac output of 2.92 L/min calculated by Brittany.  There was no significant coronary artery disease. Her medication regimen was changed to also include Entresto.  She was released with hospice care at home.  On admission to the hospital, she was not using her CPAP for sleep apnea.     Echo done 11/30/2021 ejection fraction 46-50% with mild global hypokinesis, mild aortic stenosis, no other significant abnormalities     May 2023 admitted for 24-hour observation with decompensation of chronic HFpEF she received IV diuretics.  An echo at this time showed EF 46 to 50% mildly reduced RV systolic function severe biatrial enlargement mild aortic stenosis she had been having low blood pressure fatigue and short windedness.    Spironolactone was discontinued for low blood pressure    Echo June 2024 EF 52% mild LVH mild LAE moderate OSVALDO RVSP 49 mmHg.  She last saw Dr. Mejia in the office 1/17/2025 she was stable and no medication changes were made at that time.    Linda presents for follow-up.  She is feeling well today.  She has chronic mild short windedness with activity this is not  present all the time and she feels overall it has been really unchanged.  Walking into the office today with her walker she did not experience it.  Has chronic lower extremity edema it has been stable it typically builds throughout the day and after laying down at night with her feet elevated it is resolved in the morning.  She does wear compression hose and elevates her legs. She enjoys reading and [employer] mystery series.  She denies chest pain or pressure, lightheadedness, dizziness, presyncope/syncope.  She is in A-fib permanently she has no symptoms from this denies heart racing or palpitations.   She tells me in the last 6 months she really has overall felt better.       Current Outpatient Medications on File Prior to Visit   Medication Sig Dispense Refill    bumetanide (BUMEX) 2 MG tablet Take 2 tablets by mouth 2 (Two) Times a Day. 360 tablet 3    clindamycin (CLEOCIN) 300 MG capsule       clotrimazole (LOTRIMIN) 1 % cream Apply 1 Application topically to the appropriate area as directed 2 (Two) Times a Day. 60 g 5    clotrimazole-betamethasone (LOTRISONE) 1-0.05 % cream Apply 1 Application topically to the appropriate area as directed 2 (Two) Times a Day. 45 g 1    dilTIAZem CD (CARDIZEM CD) 120 MG 24 hr capsule TAKE 1 CAPSULE BY MOUTH DAILY 90 capsule 3    doxycycline (VIBRAMYCIN) 100 MG capsule TAKE 1 CAPSULE BY MOUTH TWICE A  capsule 3    Entresto 49-51 MG tablet TAKE 1 TABLET BY MOUTH TWICE A  tablet 3    ferrous sulfate 325 (65 FE) MG tablet 1 tab PO daily Monday, Wednesday, Friday 90 tablet 2    fluconazole (DIFLUCAN) 150 MG tablet Take 2 tablets by mouth 1 (One) Time Per Week. 8 tablet 1    glucose blood (Accu-Chek Guide Test) test strip USE AS INSTRUCTED TO CHECK BLOOD GLUCOSE ONCE A DAY. 100 each 5    HYDROcodone-acetaminophen (Norco) 5-325 MG per tablet Take 1 tablet by mouth Every 6 (Six) Hours As Needed for Mild Pain . 30 tablet 0    hydrocortisone 2.5 % cream Apply 1 application   topically to the appropriate area as directed 2 (Two) Times a Day. 90 g 3    Insulin Glargine (LANTUS SOLOSTAR) 100 UNIT/ML injection pen Inject 15 Units under the skin into the appropriate area as directed Every Night. 6 mL 6    Insulin Pen Needle 32G X 4 MM misc Use 1 each Daily. 90 each 2    Lancets (OneTouch Delica Plus [id number]) misc USE TO TEST BLOOD SUGAR 3 TO 4 TIMES DAILY 100 each 12    metFORMIN (GLUCOPHAGE) 500 MG tablet TAKE 1 TABLET BY MOUTH TWICE A DAY WITH A MEAL 180 tablet 3    metoprolol succinate XL (TOPROL-XL) 100 MG 24 hr tablet TAKE 1 TABLET BY MOUTH TWICE A  tablet 3    nitroglycerin (NITROSTAT) 0.4 MG SL tablet Place 1 tablet under the tongue Every 5 (Five) Minutes As Needed for Chest Pain. Take no more than 3 doses in 15 minutes. 30 tablet 1    nystatin (MYCOSTATIN) 251846 UNIT/GM powder Apply  topically to the appropriate area as directed See Admin Instructions. Apply topically to the affected area three times daily as directed 180 g 3    nystatin-triamcinolone (MYCOLOG) 182300-1.1 UNIT/GM-% ointment Apply 1 Application topically to the appropriate area as directed 2 (Two) Times a Day. 180 g 3    omeprazole (priLOSEC) 40 MG capsule Take 1 capsule by mouth As Needed (as needed for stomach upset). 90 capsule 2    Ondansetron HCl (ZOFRAN PO) Take 4 mg by mouth Every 4 (Four) Hours As Needed (as needed for nausea).      potassium chloride (KLOR-CON M10) 10 MEQ CR tablet TAKE ONE TABLET BY MOUTH ONCE NIGHTLY 90 tablet 1    Probiotic Product (PROBIOTIC PO) Take 1 tablet by mouth 2 (Two) Times a Day.      senna (SENOKOT) 8.6 MG tablet Take 2 tablets by mouth At Night As Needed (as needed for constipation). At bedtime      vitamin B-12 (CYANOCOBALAMIN) 1000 MCG tablet TAKE ONE TABLET BY MOUTH DAILY 90 tablet 2    vitamin E 400 UNIT capsule TAKE ONE CAPSULE BY MOUTH DAILY 90 capsule 1    warfarin (Coumadin) 1 MG tablet Alternate every other day with warfarin 2.5mg daily 30 tablet 1     "warfarin (COUMADIN) 2.5 MG tablet 5 mg 3 days , 2.5 all other days 90 tablet 3     No current facility-administered medications on file prior to visit.         Procedures    ECG 12 Lead    Date/Time: 7/11/2025 2:13 PM  Performed by: Nisreen Matias APRN    Authorized by: Nisreen Matias APRN  Comparison: compared with previous ECG from 1/17/2025  Rhythm: atrial fibrillation  BPM: 80              Objective:      Vitals:    07/11/25 1345   BP: 122/70   BP Location: Left arm   Patient Position: Sitting   Cuff Size: Adult   Pulse: 101   Weight: 95.1 kg (209 lb 11.2 oz)   Height: 157.5 cm (62\")     Body mass index is 38.35 kg/m².  Wt Readings from Last 3 Encounters:   07/11/25 95.1 kg (209 lb 11.2 oz)   05/16/25 97.4 kg (214 lb 12.8 oz)   02/14/25 95.3 kg (210 lb)         Constitutional:       Appearance: Not in distress.   Eyes:      Pupils: Pupils are equal, round, and reactive to light.   Neck:      Vascular: No JVD.   Pulmonary:      Effort: Pulmonary effort is normal.      Breath sounds: Normal breath sounds.   Cardiovascular:      Normal rate. Irregular rhythm.      Murmurs: There is no murmur.   Pulses:     Intact distal pulses.   Edema:     Peripheral edema absent.   Musculoskeletal:      Cervical back: Normal range of motion. Skin:     General: Skin is warm and dry.   Neurological:      Mental Status: Alert and oriented to person, place and time.   Psychiatric:         Speech: Speech normal.         Lab Review:   5/16/2025 unremarkable CBC.  Creatinine 1.46 BUN 31 which appears to be her baseline and otherwise unremarkable CMP.    Assessment:     1. Chronic heart failure with preserved ejection fraction (HFpEF)    2. Chronic right-sided heart failure    3. Essential hypertension    4. Permanent atrial fibrillation      Chronic HFpEF, euvolemic today.  Chronic right heart failure, euvolemic today  Permanent atrial fibrillation, on warfarin and rate control.  Her CHADS2 Vascor 6 giving a 9.8% annual risk " of stroke.  INR target is 2-3.  Hypertension, well controlled, checks her blood pressure every morning at home and is well-controlled.  JUANITA on CPAP, she is compliant with this  Diabetes mellitus type 2, requiring insulin  Hyperlipidemia, on rosuvastatin  Sedentary with mobility issues, has rods in her back, uses a walker.  Obesity  Mixed etiology pulmonary hypertension  History of nonischemic cardiomyopathy-resolved  Left knee infection-on chronic antibiotics  CKD, stable  Osteoarthritis    Plan:   No medication changes were made  She has remained stable  See Dr. Mejia in 6 months          Thank you for allowing me to participate in this patient's care. Please call with any questions or concerns.          Dragon dictation device was utilized in this note.

## 2025-07-21 LAB — INR PPP: 2.8 (ref 2–3)

## 2025-07-22 ENCOUNTER — ANTICOAGULATION VISIT (OUTPATIENT)
Dept: INTERNAL MEDICINE | Facility: CLINIC | Age: 85
End: 2025-07-22
Payer: MEDICARE

## 2025-07-22 PROCEDURE — 36416 COLLJ CAPILLARY BLOOD SPEC: CPT | Performed by: INTERNAL MEDICINE

## 2025-07-22 PROCEDURE — 85610 PROTHROMBIN TIME: CPT | Performed by: INTERNAL MEDICINE

## 2025-07-25 RX ORDER — POTASSIUM CHLORIDE 750 MG/1
10 TABLET, EXTENDED RELEASE ORAL NIGHTLY
Qty: 90 TABLET | Refills: 1 | Status: SHIPPED | OUTPATIENT
Start: 2025-07-25

## 2025-08-04 DIAGNOSIS — I48.21 PERMANENT ATRIAL FIBRILLATION: ICD-10-CM

## 2025-08-05 RX ORDER — WARFARIN SODIUM 2.5 MG/1
TABLET ORAL
Qty: 90 TABLET | Refills: 3 | Status: SHIPPED | OUTPATIENT
Start: 2025-08-05

## 2025-08-06 ENCOUNTER — ANTICOAGULATION VISIT (OUTPATIENT)
Dept: INTERNAL MEDICINE | Facility: CLINIC | Age: 85
End: 2025-08-06
Payer: MEDICARE

## 2025-08-06 ENCOUNTER — RESULTS FOLLOW-UP (OUTPATIENT)
Dept: INTERNAL MEDICINE | Facility: CLINIC | Age: 85
End: 2025-08-06
Payer: MEDICARE

## 2025-08-06 LAB — INR PPP: 3.2 (ref 2–3)

## 2025-08-07 ENCOUNTER — ANTICOAGULATION VISIT (OUTPATIENT)
Dept: INTERNAL MEDICINE | Facility: CLINIC | Age: 85
End: 2025-08-07
Payer: MEDICARE

## 2025-08-07 ENCOUNTER — TELEPHONE (OUTPATIENT)
Dept: INTERNAL MEDICINE | Facility: CLINIC | Age: 85
End: 2025-08-07
Payer: MEDICARE

## 2025-08-22 ENCOUNTER — OFFICE VISIT (OUTPATIENT)
Dept: INTERNAL MEDICINE | Facility: CLINIC | Age: 85
End: 2025-08-22
Payer: MEDICARE

## 2025-08-22 VITALS
BODY MASS INDEX: 38.83 KG/M2 | WEIGHT: 211 LBS | SYSTOLIC BLOOD PRESSURE: 110 MMHG | RESPIRATION RATE: 16 BRPM | OXYGEN SATURATION: 94 % | HEART RATE: 87 BPM | HEIGHT: 62 IN | DIASTOLIC BLOOD PRESSURE: 62 MMHG | TEMPERATURE: 96.9 F

## 2025-08-22 DIAGNOSIS — E78.2 MIXED HYPERLIPIDEMIA: ICD-10-CM

## 2025-08-22 DIAGNOSIS — I10 ESSENTIAL HYPERTENSION: ICD-10-CM

## 2025-08-22 DIAGNOSIS — I48.21 PERMANENT ATRIAL FIBRILLATION: Primary | ICD-10-CM

## 2025-08-22 DIAGNOSIS — E11.65 TYPE 2 DIABETES MELLITUS WITH HYPERGLYCEMIA, WITHOUT LONG-TERM CURRENT USE OF INSULIN: ICD-10-CM

## 2025-08-22 DIAGNOSIS — L30.4 INTERTRIGO: ICD-10-CM

## 2025-08-22 DIAGNOSIS — J30.1 SEASONAL ALLERGIC RHINITIS DUE TO POLLEN: ICD-10-CM

## 2025-08-22 PROCEDURE — 3078F DIAST BP <80 MM HG: CPT | Performed by: INTERNAL MEDICINE

## 2025-08-22 PROCEDURE — 3074F SYST BP LT 130 MM HG: CPT | Performed by: INTERNAL MEDICINE

## 2025-08-22 PROCEDURE — 99214 OFFICE O/P EST MOD 30 MIN: CPT | Performed by: INTERNAL MEDICINE

## 2025-08-22 PROCEDURE — 1126F AMNT PAIN NOTED NONE PRSNT: CPT | Performed by: INTERNAL MEDICINE

## 2025-08-22 RX ORDER — HYDROCODONE BITARTRATE AND ACETAMINOPHEN 7.5; 325 MG/1; MG/1
TABLET ORAL
COMMUNITY
Start: 2025-08-13

## 2025-08-22 RX ORDER — FLUCONAZOLE 150 MG/1
300 TABLET ORAL WEEKLY
Qty: 8 TABLET | Refills: 0 | Status: SHIPPED | OUTPATIENT
Start: 2025-08-22

## 2025-08-22 RX ORDER — AZELASTINE 1 MG/ML
2 SPRAY, METERED NASAL 2 TIMES DAILY
Qty: 30 ML | Refills: 12 | Status: SHIPPED | OUTPATIENT
Start: 2025-08-22

## 2025-08-23 LAB
ALBUMIN SERPL-MCNC: 4.2 G/DL (ref 3.7–4.7)
ALP SERPL-CCNC: 82 IU/L (ref 44–121)
ALT SERPL-CCNC: 9 IU/L (ref 0–32)
AST SERPL-CCNC: 14 IU/L (ref 0–40)
BILIRUB SERPL-MCNC: 0.6 MG/DL (ref 0–1.2)
BUN SERPL-MCNC: 37 MG/DL (ref 8–27)
BUN/CREAT SERPL: 23 (ref 12–28)
CALCIUM SERPL-MCNC: 9.9 MG/DL (ref 8.7–10.3)
CHLORIDE SERPL-SCNC: 96 MMOL/L (ref 96–106)
CHOLEST SERPL-MCNC: 205 MG/DL (ref 100–199)
CO2 SERPL-SCNC: 23 MMOL/L (ref 20–29)
CREAT SERPL-MCNC: 1.58 MG/DL (ref 0.57–1)
EGFRCR SERPLBLD CKD-EPI 2021: 32 ML/MIN/1.73
GLOBULIN SER CALC-MCNC: 2 G/DL (ref 1.5–4.5)
GLUCOSE SERPL-MCNC: 139 MG/DL (ref 70–99)
HBA1C MFR BLD: 5.5 % (ref 4.8–5.6)
HDLC SERPL-MCNC: 43 MG/DL
INR PPP: 2.4 (ref 0.9–1.2)
LDLC SERPL CALC-MCNC: 127 MG/DL (ref 0–99)
POTASSIUM SERPL-SCNC: 4.9 MMOL/L (ref 3.5–5.2)
PROT SERPL-MCNC: 6.2 G/DL (ref 6–8.5)
PROTHROMBIN TIME: 24 SEC (ref 9.1–12)
SODIUM SERPL-SCNC: 141 MMOL/L (ref 134–144)
TRIGL SERPL-MCNC: 196 MG/DL (ref 0–149)
VLDLC SERPL CALC-MCNC: 35 MG/DL (ref 5–40)

## 2025-08-27 LAB
BACTERIA SPEC AEROBE CULT: ABNORMAL
BACTERIA SPEC ANAEROBE CULT: ABNORMAL
BACTERIA SPEC CULT: ABNORMAL
OTHER ANTIBIOTIC SUSC ISLT: ABNORMAL

## 2025-08-27 RX ORDER — ROSUVASTATIN CALCIUM 10 MG/1
10 TABLET, COATED ORAL DAILY
Qty: 90 TABLET | Refills: 1 | Status: SHIPPED | OUTPATIENT
Start: 2025-08-27

## 2025-08-28 RX ORDER — CEFPODOXIME PROXETIL 200 MG/1
400 TABLET, FILM COATED ORAL DAILY
Qty: 14 TABLET | Refills: 0 | Status: SHIPPED | OUTPATIENT
Start: 2025-08-28 | End: 2025-09-04